# Patient Record
Sex: FEMALE | Race: BLACK OR AFRICAN AMERICAN | NOT HISPANIC OR LATINO | Employment: UNEMPLOYED | URBAN - METROPOLITAN AREA
[De-identification: names, ages, dates, MRNs, and addresses within clinical notes are randomized per-mention and may not be internally consistent; named-entity substitution may affect disease eponyms.]

---

## 2020-09-02 ENCOUNTER — OFFICE VISIT (OUTPATIENT)
Dept: OBSTETRICS AND GYNECOLOGY | Facility: CLINIC | Age: 30
End: 2020-09-02
Payer: MEDICAID

## 2020-09-02 ENCOUNTER — LAB VISIT (OUTPATIENT)
Dept: LAB | Facility: HOSPITAL | Age: 30
End: 2020-09-02
Attending: MIDWIFE
Payer: MEDICAID

## 2020-09-02 ENCOUNTER — PATIENT MESSAGE (OUTPATIENT)
Dept: OBSTETRICS AND GYNECOLOGY | Facility: CLINIC | Age: 30
End: 2020-09-02

## 2020-09-02 VITALS
HEIGHT: 63 IN | SYSTOLIC BLOOD PRESSURE: 110 MMHG | BODY MASS INDEX: 18.71 KG/M2 | WEIGHT: 105.63 LBS | DIASTOLIC BLOOD PRESSURE: 60 MMHG

## 2020-09-02 DIAGNOSIS — Z32.00 POSSIBLE PREGNANCY: ICD-10-CM

## 2020-09-02 DIAGNOSIS — Z32.00 POSSIBLE PREGNANCY: Primary | ICD-10-CM

## 2020-09-02 LAB — HCG INTACT+B SERPL-ACNC: 5.6 MIU/ML

## 2020-09-02 PROCEDURE — 99203 OFFICE O/P NEW LOW 30 MIN: CPT | Mod: PBBFAC | Performed by: MIDWIFE

## 2020-09-02 PROCEDURE — 99999 PR PBB SHADOW E&M-NEW PATIENT-LVL III: ICD-10-PCS | Mod: PBBFAC,,, | Performed by: MIDWIFE

## 2020-09-02 PROCEDURE — 87491 CHLMYD TRACH DNA AMP PROBE: CPT

## 2020-09-02 PROCEDURE — 99999 PR PBB SHADOW E&M-NEW PATIENT-LVL III: CPT | Mod: PBBFAC,,, | Performed by: MIDWIFE

## 2020-09-02 PROCEDURE — 36415 COLL VENOUS BLD VENIPUNCTURE: CPT

## 2020-09-02 PROCEDURE — 99203 OFFICE O/P NEW LOW 30 MIN: CPT | Mod: S$PBB,,, | Performed by: MIDWIFE

## 2020-09-02 PROCEDURE — 84702 CHORIONIC GONADOTROPIN TEST: CPT

## 2020-09-02 PROCEDURE — 99203 PR OFFICE/OUTPT VISIT, NEW, LEVL III, 30-44 MIN: ICD-10-PCS | Mod: S$PBB,,, | Performed by: MIDWIFE

## 2020-09-02 NOTE — PROGRESS NOTES
"Subjective:      Kelsi Ngo is a 29 y.o. female who presents for evaluation of amenorrhea. She believes she could be pregnant. Pregnancy is desired. Current symptoms also include: breast tenderness and positive home pregnancy test. Last period was abnormal. States had some light spotting for ~ 1 day, but denies any pain. Reports small amount of white discharge with a musty odor. Denies burning or itching. Reports having a PAP 2 years ago after the birth of her last baby, WNL per patient.      Patient's last menstrual period was 07/07/2020.  The following portions of the patient's history were reviewed and updated as appropriate: allergies, current medications, past family history, past medical history, past social history, past surgical history and problem list.    Review of Systems  Pertinent items are noted in HPI.       Objective:      /60   Ht 5' 3" (1.6 m)   Wt 47.9 kg (105 lb 9.6 oz)   LMP 07/07/2020   BMI 18.71 kg/m²   General: alert, appears stated age, no distress and no acute distress     Vagina: small amount of thin, white discharge present. Wet prep done: moderate amount of clue cells. No yeast or trich present.      Lab Review  Urine HCG: negative      Assessment:      Absence of menstruation.       Plan:     Gc/Chlamydia ordered and sent   Will do serum HCG today  Wet prep: + BV, will send Rx dependant on results of serum HCG  Discussed contraception if HCG quant is negative. Desires Depo-provera.   F/u dependant on HCG results. Due for PAP no later than 2021.    "

## 2020-09-08 ENCOUNTER — PATIENT MESSAGE (OUTPATIENT)
Dept: OBSTETRICS AND GYNECOLOGY | Facility: CLINIC | Age: 30
End: 2020-09-08

## 2020-10-03 LAB
C TRACH DNA SPEC QL NAA+PROBE: NOT DETECTED
N GONORRHOEA DNA SPEC QL NAA+PROBE: NOT DETECTED

## 2020-12-03 ENCOUNTER — PATIENT MESSAGE (OUTPATIENT)
Dept: OBSTETRICS AND GYNECOLOGY | Facility: CLINIC | Age: 30
End: 2020-12-03

## 2020-12-06 ENCOUNTER — PATIENT MESSAGE (OUTPATIENT)
Dept: OBSTETRICS AND GYNECOLOGY | Facility: CLINIC | Age: 30
End: 2020-12-06

## 2022-05-31 ENCOUNTER — LAB VISIT (OUTPATIENT)
Dept: LAB | Facility: HOSPITAL | Age: 32
End: 2022-05-31
Attending: ADVANCED PRACTICE MIDWIFE
Payer: MEDICAID

## 2022-05-31 ENCOUNTER — OFFICE VISIT (OUTPATIENT)
Dept: OBSTETRICS AND GYNECOLOGY | Facility: CLINIC | Age: 32
End: 2022-05-31
Payer: MEDICAID

## 2022-05-31 VITALS
WEIGHT: 120.38 LBS | HEIGHT: 63 IN | DIASTOLIC BLOOD PRESSURE: 58 MMHG | BODY MASS INDEX: 21.33 KG/M2 | SYSTOLIC BLOOD PRESSURE: 114 MMHG

## 2022-05-31 DIAGNOSIS — Z32.01 POSITIVE PREGNANCY TEST: ICD-10-CM

## 2022-05-31 DIAGNOSIS — Z98.891 PREVIOUS CESAREAN SECTION: Primary | ICD-10-CM

## 2022-05-31 PROCEDURE — 36415 COLL VENOUS BLD VENIPUNCTURE: CPT | Performed by: ADVANCED PRACTICE MIDWIFE

## 2022-05-31 PROCEDURE — 87491 CHLMYD TRACH DNA AMP PROBE: CPT | Performed by: ADVANCED PRACTICE MIDWIFE

## 2022-05-31 PROCEDURE — 87389 HIV-1 AG W/HIV-1&-2 AB AG IA: CPT | Performed by: ADVANCED PRACTICE MIDWIFE

## 2022-05-31 PROCEDURE — 86592 SYPHILIS TEST NON-TREP QUAL: CPT | Performed by: ADVANCED PRACTICE MIDWIFE

## 2022-05-31 PROCEDURE — 3074F SYST BP LT 130 MM HG: CPT | Mod: CPTII,,, | Performed by: ADVANCED PRACTICE MIDWIFE

## 2022-05-31 PROCEDURE — 85025 COMPLETE CBC W/AUTO DIFF WBC: CPT | Performed by: ADVANCED PRACTICE MIDWIFE

## 2022-05-31 PROCEDURE — 99203 PR OFFICE/OUTPT VISIT, NEW, LEVL III, 30-44 MIN: ICD-10-PCS | Mod: S$PBB,TH,, | Performed by: ADVANCED PRACTICE MIDWIFE

## 2022-05-31 PROCEDURE — 88175 CYTOPATH C/V AUTO FLUID REDO: CPT | Performed by: STUDENT IN AN ORGANIZED HEALTH CARE EDUCATION/TRAINING PROGRAM

## 2022-05-31 PROCEDURE — 3078F PR MOST RECENT DIASTOLIC BLOOD PRESSURE < 80 MM HG: ICD-10-PCS | Mod: CPTII,,, | Performed by: ADVANCED PRACTICE MIDWIFE

## 2022-05-31 PROCEDURE — 3008F PR BODY MASS INDEX (BMI) DOCUMENTED: ICD-10-PCS | Mod: CPTII,,, | Performed by: ADVANCED PRACTICE MIDWIFE

## 2022-05-31 PROCEDURE — 99212 OFFICE O/P EST SF 10 MIN: CPT | Mod: PBBFAC,TH | Performed by: ADVANCED PRACTICE MIDWIFE

## 2022-05-31 PROCEDURE — 88141 PR  CYTOPATH CERV/VAG INTERPRET: ICD-10-PCS | Mod: ,,, | Performed by: STUDENT IN AN ORGANIZED HEALTH CARE EDUCATION/TRAINING PROGRAM

## 2022-05-31 PROCEDURE — 86762 RUBELLA ANTIBODY: CPT | Performed by: ADVANCED PRACTICE MIDWIFE

## 2022-05-31 PROCEDURE — 99999 PR PBB SHADOW E&M-EST. PATIENT-LVL II: CPT | Mod: PBBFAC,,, | Performed by: ADVANCED PRACTICE MIDWIFE

## 2022-05-31 PROCEDURE — 86850 RBC ANTIBODY SCREEN: CPT | Performed by: ADVANCED PRACTICE MIDWIFE

## 2022-05-31 PROCEDURE — 99999 PR PBB SHADOW E&M-EST. PATIENT-LVL II: ICD-10-PCS | Mod: PBBFAC,,, | Performed by: ADVANCED PRACTICE MIDWIFE

## 2022-05-31 PROCEDURE — 99203 OFFICE O/P NEW LOW 30 MIN: CPT | Mod: S$PBB,TH,, | Performed by: ADVANCED PRACTICE MIDWIFE

## 2022-05-31 PROCEDURE — 88141 CYTOPATH C/V INTERPRET: CPT | Mod: ,,, | Performed by: STUDENT IN AN ORGANIZED HEALTH CARE EDUCATION/TRAINING PROGRAM

## 2022-05-31 PROCEDURE — 87624 HPV HI-RISK TYP POOLED RSLT: CPT | Performed by: ADVANCED PRACTICE MIDWIFE

## 2022-05-31 PROCEDURE — 86803 HEPATITIS C AB TEST: CPT | Performed by: ADVANCED PRACTICE MIDWIFE

## 2022-05-31 PROCEDURE — 3078F DIAST BP <80 MM HG: CPT | Mod: CPTII,,, | Performed by: ADVANCED PRACTICE MIDWIFE

## 2022-05-31 PROCEDURE — 3074F PR MOST RECENT SYSTOLIC BLOOD PRESSURE < 130 MM HG: ICD-10-PCS | Mod: CPTII,,, | Performed by: ADVANCED PRACTICE MIDWIFE

## 2022-05-31 PROCEDURE — 87340 HEPATITIS B SURFACE AG IA: CPT | Performed by: ADVANCED PRACTICE MIDWIFE

## 2022-05-31 PROCEDURE — 1159F PR MEDICATION LIST DOCUMENTED IN MEDICAL RECORD: ICD-10-PCS | Mod: CPTII,,, | Performed by: ADVANCED PRACTICE MIDWIFE

## 2022-05-31 PROCEDURE — 87591 N.GONORRHOEAE DNA AMP PROB: CPT | Performed by: ADVANCED PRACTICE MIDWIFE

## 2022-05-31 PROCEDURE — 3008F BODY MASS INDEX DOCD: CPT | Mod: CPTII,,, | Performed by: ADVANCED PRACTICE MIDWIFE

## 2022-05-31 PROCEDURE — 81025 URINE PREGNANCY TEST: CPT | Mod: PBBFAC | Performed by: ADVANCED PRACTICE MIDWIFE

## 2022-05-31 PROCEDURE — 1159F MED LIST DOCD IN RCRD: CPT | Mod: CPTII,,, | Performed by: ADVANCED PRACTICE MIDWIFE

## 2022-05-31 RX ORDER — ONDANSETRON 4 MG/1
4 TABLET, FILM COATED ORAL DAILY PRN
Qty: 30 TABLET | Refills: 1 | Status: SHIPPED | OUTPATIENT
Start: 2022-05-31 | End: 2022-07-05 | Stop reason: SDUPTHER

## 2022-05-31 NOTE — PROGRESS NOTES
CHIEF COMPLAINT:   Patient presents with      Possible Pregnancy        HISTORY OF PRESENT ILLNESS  Kelsi Ngo 31 y.o.  presents for pregnancy risk assessment.   The patient has no complaints today.  No nausea or vomiting. No bleeding or pain.  Pregnancy was not  planned but is desired.  Partner is supportive of pregnancy.  Lives at home with mom  No pets at home.  Works at home  Denies domestic abuse.  Denies chemical/pesticide/radiation exposure.  OB history: x3 C/S x1 PROM breech      LMP: Patient's last menstrual period was 2022 (exact date).  EDC: Estimated Date of Delivery: 22  EGA: 16w4d      Health Maintenance   Topic Date Due    Hepatitis C Screening  Never done    Lipid Panel  Never done    TETANUS VACCINE  Never done       Past Medical History:   Diagnosis Date    Heart murmur        Past Surgical History:   Procedure Laterality Date     SECTION      emergency c section 2021       Family History   Problem Relation Age of Onset    Cancer Paternal Grandmother     Hypertension Father     Hypertension Mother     Cancer Mother        Social History     Socioeconomic History    Marital status: Single   Tobacco Use    Smoking status: Never Smoker    Smokeless tobacco: Never Used   Substance and Sexual Activity    Alcohol use: Never    Drug use: Never    Sexual activity: Yes     Partners: Male     Birth control/protection: None       No current outpatient medications on file.     No current facility-administered medications for this visit.       Review of patient's allergies indicates:   Allergen Reactions    Ibuprofen Itching         PHYSICAL EXAM   Vitals:    22 1409   BP: (!) 114/58        PAIN SCALE: 0/10 None    PHYSICAL EXAM    ROS:  GENERAL: No fever, chills, fatigability or weight loss.  CV: Denies chest pain  PULM: Denies shortness of breath or wheezing.  ABDOMEN: Appetite fine. No weight loss. Denies diarrhea, abdominal pain, hematemesis or  blood in stool.  URINARY: No flank pain, dysuria or hematuria.  REPRODUCTIVE: No abnormal vaginal bleeding.       PE:   APPEARANCE: Well nourished, well developed, in no acute distress  CHEST: Clear to auscultation bilaterally  CV: Regular rate and rhythm  BREASTS: Symmetrical, no skin changes or visible lesions. No palpable masses, nipple discharge or adenopathy bilaterally.  ABDOMEN: Soft. No tenderness or masses. No hepatosplenomegaly. No hernias  PELVIC:   VULVA: No lesions. Normal female genitalia.  URETHRAL MEATUS: Normal size and location, no lesions, no prolapse.  URETHRA: No masses, tenderness, prolapse or scarring.  VAGINA: Moist and well rugated, no discharge, no significant cystocele or rectocele.  CERVIX: No lesions, normal diameter, no stenosis, no cervical motion tenderness.   UTERUS: 16size, regular shape, mobile, non-tender, normal position, good support.  ADNEXA: No masses, tenderness or CDS nodularity.  ANUS PERINEUM: No lesions, no relaxation, no external hemorrhoids.     UPT +    A/P:     -      Patient was counseled today on A.C.S. Pap guidelines and recommendations for yearly pelvic exams, mammograms and monthly self breast exams; to see her PCP for other health maintenance and pregnancy.   -      Patient's medications and medical history reviewed with patient and implications in pregnancy.   -      Pregnancy course discussed and 'AtoZ' book given. Patient was counseled on proper weight gain based on the [place] [employer]'s recommendations based on her pre-pregnancy weight. Discussed foods to avoid in pregnancy (i.e. sushi, fish that are high in mercury, deli meat, and unpasteurized cheeses). Discussed prenatal vitamin options (i.e. stool softener, DHA). Discussed potential medical problems in pregnancy.  -     Discussed risk of Toxoplasmosis transmission from pets and reviewed risk reduction techniques.    -     Chromosomal abnormality risk discussed and available testing offered -  declined.   -     Pt was counseled on exercise in pregnancy and weight gain recommendations.   - ACOG and SMFM recommend that all eligible persons, including pregnant and lactating individuals, receive a COVID-19 vaccine or vaccine series. There is no evidence of adverse maternal or fetal effects from vaccinating pregnant individuals with COVID-19 vaccine, and a growing body of data demonstrate the safety of such use. Discussed that there is no indication to delay vaccination until after the first trimester or until the postpartum period.   Claims linking COVID-19 vaccines to infertility are unfounded and have no scientific evidence supporting them. ACOG recommends vaccination for all eligible people who may consider future pregnancy.   Expected side effects were discussed and explained that they are a normal part of the body's reaction to the vaccine and developing antibodies to protect against COVID-19 illness.   Women under age 50 including pregnant individuals can receive any FDA-authorized COVID-19 vaccine available to them. However, there is a rare risk (around 1 in 150,000) of thrombosis with thrombocytopenia syndrome (TTS) after receipt of the Rima COVID-19 vaccine which was discussed.   COVID-19 vaccines may be administered simultaneously with other vaccines, including within 14 days of receipt of another vaccine. This includes vaccines routinely administered during pregnancy, such as influenza and Tdap.    -     Oriented to practice including CNM collaboration.   -     Follow-up initial OB, with labs and u/s.  Pap and genprobe done

## 2022-06-01 ENCOUNTER — PATIENT MESSAGE (OUTPATIENT)
Dept: OBSTETRICS AND GYNECOLOGY | Facility: CLINIC | Age: 32
End: 2022-06-01
Payer: MEDICAID

## 2022-06-01 PROBLEM — D50.8 IRON DEFICIENCY ANEMIA SECONDARY TO INADEQUATE DIETARY IRON INTAKE: Status: ACTIVE | Noted: 2022-06-01

## 2022-06-01 LAB
ABO + RH BLD: NORMAL
BASOPHILS # BLD AUTO: 0.04 K/UL (ref 0–0.2)
BASOPHILS NFR BLD: 0.6 % (ref 0–1.9)
BLD GP AB SCN CELLS X3 SERPL QL: NORMAL
DIFFERENTIAL METHOD: ABNORMAL
EOSINOPHIL # BLD AUTO: 0.1 K/UL (ref 0–0.5)
EOSINOPHIL NFR BLD: 1 % (ref 0–8)
ERYTHROCYTE [DISTWIDTH] IN BLOOD BY AUTOMATED COUNT: 14.6 % (ref 11.5–14.5)
HBV SURFACE AG SERPL QL IA: NEGATIVE
HCT VFR BLD AUTO: 30.9 % (ref 37–48.5)
HCV AB SERPL QL IA: NEGATIVE
HGB BLD-MCNC: 9.8 G/DL (ref 12–16)
HIV 1+2 AB+HIV1 P24 AG SERPL QL IA: NEGATIVE
IMM GRANULOCYTES # BLD AUTO: 0.02 K/UL (ref 0–0.04)
IMM GRANULOCYTES NFR BLD AUTO: 0.3 % (ref 0–0.5)
LYMPHOCYTES # BLD AUTO: 1.6 K/UL (ref 1–4.8)
LYMPHOCYTES NFR BLD: 24.4 % (ref 18–48)
MCH RBC QN AUTO: 28.7 PG (ref 27–31)
MCHC RBC AUTO-ENTMCNC: 31.7 G/DL (ref 32–36)
MCV RBC AUTO: 91 FL (ref 82–98)
MONOCYTES # BLD AUTO: 0.4 K/UL (ref 0.3–1)
MONOCYTES NFR BLD: 6.5 % (ref 4–15)
NEUTROPHILS # BLD AUTO: 4.5 K/UL (ref 1.8–7.7)
NEUTROPHILS NFR BLD: 67.2 % (ref 38–73)
NRBC BLD-RTO: 0 /100 WBC
PLATELET # BLD AUTO: 135 K/UL (ref 150–450)
PMV BLD AUTO: 13.3 FL (ref 9.2–12.9)
RBC # BLD AUTO: 3.41 M/UL (ref 4–5.4)
RPR SER QL: NORMAL
RUBV IGG SER-ACNC: 14 IU/ML
RUBV IGG SER-IMP: REACTIVE
WBC # BLD AUTO: 6.72 K/UL (ref 3.9–12.7)

## 2022-06-01 RX ORDER — FERROUS SULFATE 325(65) MG
325 TABLET, DELAYED RELEASE (ENTERIC COATED) ORAL 2 TIMES DAILY
Qty: 60 TABLET | Refills: 3 | Status: SHIPPED | OUTPATIENT
Start: 2022-06-01 | End: 2022-07-05 | Stop reason: SDUPTHER

## 2022-06-03 LAB
C TRACH DNA SPEC QL NAA+PROBE: NOT DETECTED
N GONORRHOEA DNA SPEC QL NAA+PROBE: NOT DETECTED

## 2022-06-08 ENCOUNTER — DOCUMENTATION ONLY (OUTPATIENT)
Dept: OBSTETRICS AND GYNECOLOGY | Facility: CLINIC | Age: 32
End: 2022-06-08

## 2022-06-08 ENCOUNTER — PROCEDURE VISIT (OUTPATIENT)
Dept: OBSTETRICS AND GYNECOLOGY | Facility: CLINIC | Age: 32
End: 2022-06-08
Payer: MEDICAID

## 2022-06-08 DIAGNOSIS — Z32.01 POSITIVE PREGNANCY TEST: ICD-10-CM

## 2022-06-08 PROCEDURE — 76815 OB US LIMITED FETUS(S): CPT | Mod: 26,S$PBB,, | Performed by: OBSTETRICS & GYNECOLOGY

## 2022-06-08 PROCEDURE — 76801 OB US < 14 WKS SINGLE FETUS: CPT | Mod: PBBFAC | Performed by: OBSTETRICS & GYNECOLOGY

## 2022-06-08 PROCEDURE — 76815 US OB/GYN PROCEDURE (VIEWPOINT): ICD-10-PCS | Mod: 26,S$PBB,, | Performed by: OBSTETRICS & GYNECOLOGY

## 2022-06-08 NOTE — PROGRESS NOTES
Patient left prior to being seen by provider following her ultrasound due to family emergency. Will have staff reschedule aptSukhi Galvez

## 2022-06-10 LAB
FINAL PATHOLOGIC DIAGNOSIS: ABNORMAL
Lab: ABNORMAL

## 2022-06-15 LAB
HPV HR 12 DNA SPEC QL NAA+PROBE: NEGATIVE
HPV16 AG SPEC QL: NEGATIVE
HPV18 DNA SPEC QL NAA+PROBE: NEGATIVE

## 2022-06-19 PROBLEM — R87.610 ASCUS OF CERVIX WITH NEGATIVE HIGH RISK HPV: Status: ACTIVE | Noted: 2022-06-19

## 2022-06-21 ENCOUNTER — TELEPHONE (OUTPATIENT)
Dept: OBSTETRICS AND GYNECOLOGY | Facility: CLINIC | Age: 32
End: 2022-06-21

## 2022-06-27 PROBLEM — O34.219 HISTORY OF CESAREAN DELIVERY, CURRENTLY PREGNANT: Status: ACTIVE | Noted: 2022-06-27

## 2022-06-27 PROBLEM — O99.011 ANEMIA DURING PREGNANCY IN FIRST TRIMESTER: Status: ACTIVE | Noted: 2022-06-01

## 2022-07-18 ENCOUNTER — LAB VISIT (OUTPATIENT)
Dept: LAB | Facility: HOSPITAL | Age: 32
End: 2022-07-18
Payer: MEDICAID

## 2022-07-18 ENCOUNTER — INITIAL PRENATAL (OUTPATIENT)
Dept: OBSTETRICS AND GYNECOLOGY | Facility: CLINIC | Age: 32
End: 2022-07-18
Payer: MEDICAID

## 2022-07-18 VITALS
SYSTOLIC BLOOD PRESSURE: 102 MMHG | BODY MASS INDEX: 22.18 KG/M2 | DIASTOLIC BLOOD PRESSURE: 60 MMHG | WEIGHT: 125.25 LBS

## 2022-07-18 DIAGNOSIS — N89.8 VAGINAL ODOR: ICD-10-CM

## 2022-07-18 DIAGNOSIS — O09.32 LIMITED PRENATAL CARE IN SECOND TRIMESTER: ICD-10-CM

## 2022-07-18 DIAGNOSIS — Z34.92 NORMAL PREGNANCY IN SECOND TRIMESTER: ICD-10-CM

## 2022-07-18 DIAGNOSIS — K59.00 CONSTIPATION, UNSPECIFIED CONSTIPATION TYPE: ICD-10-CM

## 2022-07-18 DIAGNOSIS — O34.219 PATIENT DESIRES VAGINAL BIRTH AFTER CESAREAN SECTION (VBAC): ICD-10-CM

## 2022-07-18 DIAGNOSIS — Z3A.23 23 WEEKS GESTATION OF PREGNANCY: Primary | ICD-10-CM

## 2022-07-18 PROCEDURE — 99999 PR PBB SHADOW E&M-EST. PATIENT-LVL II: CPT | Mod: PBBFAC,,, | Performed by: MIDWIFE

## 2022-07-18 PROCEDURE — 99213 PR OFFICE/OUTPT VISIT, EST, LEVL III, 20-29 MIN: ICD-10-PCS | Mod: S$PBB,TH,, | Performed by: MIDWIFE

## 2022-07-18 PROCEDURE — 87481 CANDIDA DNA AMP PROBE: CPT | Mod: 59 | Performed by: MIDWIFE

## 2022-07-18 PROCEDURE — 36415 COLL VENOUS BLD VENIPUNCTURE: CPT | Performed by: MIDWIFE

## 2022-07-18 PROCEDURE — 99213 OFFICE O/P EST LOW 20 MIN: CPT | Mod: S$PBB,TH,, | Performed by: MIDWIFE

## 2022-07-18 PROCEDURE — 99999 PR PBB SHADOW E&M-EST. PATIENT-LVL II: ICD-10-PCS | Mod: PBBFAC,,, | Performed by: MIDWIFE

## 2022-07-18 PROCEDURE — 99212 OFFICE O/P EST SF 10 MIN: CPT | Mod: PBBFAC,TH | Performed by: MIDWIFE

## 2022-07-18 RX ORDER — DOCUSATE SODIUM 100 MG/1
100 CAPSULE, LIQUID FILLED ORAL 2 TIMES DAILY PRN
Qty: 30 CAPSULE | Refills: 1 | Status: SHIPPED | OUTPATIENT
Start: 2022-07-18 | End: 2022-08-22 | Stop reason: SDUPTHER

## 2022-07-18 NOTE — PROGRESS NOTES
31 year old L5 here for Initial OB visit  Lapse in care since pregnancy confirmation visit. Pt states she had to leave after her dating scan d/t a family emergency.  Doing well, lots of fetal movement!  Denies CTX, VB, LOF. Just some discomfort over her c/s scar that resolves with warm compresses.   C/o white vaginal discharge with odor. AFFIRM collected.  C/o toothache. Patient is established with a dentist. Letter given for clearance and instructed to go ASAP.   Reviewed problem list with patient. ASCUS PAP with negative HPV. Patient states she had recent colposcopy at  and was told this was normal, but to repeat PAP in one year. Records requested.  Patient desires TOLAC. Previous SVDx3 with PCS for twins/PPROM. KYLE sent for OP report.  Is taking iron as prescribed, but states makes her constipated. Rx sent for Colace.   Discussed genetic screening in pregnancy. Desires Mat21 today.  4 lb 13.6 oz TWG, S=D  Normal kick counts and PTL precautions reviewed  RTC in 1-2 weeks with anatomy scan, sooner if needed    I spent a total of 20 minutes on the day of the visit.This includes face to face time and non-face to face time preparing to see the patient (eg, review of tests), Obtaining and/or reviewing separately obtained history, Documenting clinical information in the electronic or other health record, Independently interpreting resultsand communicating results to the patient/family/caregiver, or Care coordination.

## 2022-07-19 ENCOUNTER — PATIENT MESSAGE (OUTPATIENT)
Dept: OBSTETRICS AND GYNECOLOGY | Facility: CLINIC | Age: 32
End: 2022-07-19
Payer: MEDICAID

## 2022-07-19 DIAGNOSIS — B96.89 BV (BACTERIAL VAGINOSIS): Primary | ICD-10-CM

## 2022-07-19 DIAGNOSIS — N76.0 BV (BACTERIAL VAGINOSIS): Primary | ICD-10-CM

## 2022-07-19 LAB
BACTERIAL VAGINOSIS DNA: POSITIVE
CANDIDA GLABRATA DNA: NEGATIVE
CANDIDA KRUSEI DNA: NEGATIVE
CANDIDA RRNA VAG QL PROBE: NEGATIVE
T VAGINALIS RRNA GENITAL QL PROBE: NEGATIVE

## 2022-07-19 RX ORDER — METRONIDAZOLE 500 MG/1
500 TABLET ORAL EVERY 12 HOURS
Qty: 14 TABLET | Refills: 0 | Status: SHIPPED | OUTPATIENT
Start: 2022-07-19 | End: 2022-07-26

## 2022-07-26 ENCOUNTER — PATIENT MESSAGE (OUTPATIENT)
Dept: OBSTETRICS AND GYNECOLOGY | Facility: CLINIC | Age: 32
End: 2022-07-26
Payer: MEDICAID

## 2022-08-22 ENCOUNTER — PATIENT MESSAGE (OUTPATIENT)
Dept: OBSTETRICS AND GYNECOLOGY | Facility: CLINIC | Age: 32
End: 2022-08-22
Payer: MEDICAID

## 2022-08-22 DIAGNOSIS — K59.00 CONSTIPATION, UNSPECIFIED CONSTIPATION TYPE: ICD-10-CM

## 2022-08-22 NOTE — TELEPHONE ENCOUNTER
Please see the attached refill request.  Medication Renewal Request  Received: Today  Kelsi Ngo Staff  Phone Number: [phone]     Refills have been requested for the following medications:         docusate sodium (COLACE) 100 MG capsule [Jayna Villa]     [id number] [facility]   Delivery method: Pickup       Medication renewals requested in this message routed separately:         prenatal vit 14-iron fum-folic (COMPLETENATE) 29 mg iron- 1 mg Chew [Serina Hair]         ondansetron (ZOFRAN) 4 MG tablet [Serina Hair]         ferrous sulfate 325 (65 FE) MG EC tablet [Serina Hair]

## 2022-08-23 RX ORDER — DOCUSATE SODIUM 100 MG/1
100 CAPSULE, LIQUID FILLED ORAL 2 TIMES DAILY PRN
Qty: 30 CAPSULE | Refills: 1 | Status: SHIPPED | OUTPATIENT
Start: 2022-08-23

## 2022-09-15 ENCOUNTER — PATIENT MESSAGE (OUTPATIENT)
Dept: ADMINISTRATIVE | Facility: OTHER | Age: 32
End: 2022-09-15
Payer: MEDICAID

## 2022-10-22 ENCOUNTER — HOSPITAL ENCOUNTER (OUTPATIENT)
Facility: HOSPITAL | Age: 32
Discharge: HOME | End: 2022-10-22
Attending: OBSTETRICS & GYNECOLOGY | Admitting: OBSTETRICS & GYNECOLOGY
Payer: MEDICAID

## 2022-10-22 VITALS — SYSTOLIC BLOOD PRESSURE: 112 MMHG | DIASTOLIC BLOOD PRESSURE: 93 MMHG | HEART RATE: 75 BPM | OXYGEN SATURATION: 98 %

## 2022-10-22 DIAGNOSIS — N89.8 CLEAR VAGINAL DISCHARGE: ICD-10-CM

## 2022-10-22 PROBLEM — O09.32 LIMITED PRENATAL CARE IN SECOND TRIMESTER: Status: RESOLVED | Noted: 2022-07-18 | Resolved: 2022-10-22

## 2022-10-22 PROBLEM — Z34.92 NORMAL PREGNANCY IN SECOND TRIMESTER: Status: RESOLVED | Noted: 2022-07-18 | Resolved: 2022-10-22

## 2022-10-22 PROCEDURE — 99214 PR OFFICE/OUTPT VISIT, EST, LEVL IV, 30-39 MIN: ICD-10-PCS | Mod: TH,,, | Performed by: OBSTETRICS & GYNECOLOGY

## 2022-10-22 PROCEDURE — 59025 FETAL NON-STRESS TEST: CPT

## 2022-10-22 PROCEDURE — 99214 OFFICE O/P EST MOD 30 MIN: CPT | Mod: TH,,, | Performed by: OBSTETRICS & GYNECOLOGY

## 2022-10-22 PROCEDURE — 99211 OFF/OP EST MAY X REQ PHY/QHP: CPT | Mod: 25

## 2022-10-22 RX ORDER — ONDANSETRON 8 MG/1
8 TABLET, ORALLY DISINTEGRATING ORAL EVERY 8 HOURS PRN
Status: DISCONTINUED | OUTPATIENT
Start: 2022-10-22 | End: 2022-10-22 | Stop reason: HOSPADM

## 2022-10-22 RX ORDER — PROCHLORPERAZINE EDISYLATE 5 MG/ML
5 INJECTION INTRAMUSCULAR; INTRAVENOUS EVERY 6 HOURS PRN
Status: DISCONTINUED | OUTPATIENT
Start: 2022-10-22 | End: 2022-10-22 | Stop reason: HOSPADM

## 2022-10-22 RX ORDER — ACETAMINOPHEN 500 MG
500 TABLET ORAL EVERY 6 HOURS PRN
Status: DISCONTINUED | OUTPATIENT
Start: 2022-10-22 | End: 2022-10-22 | Stop reason: HOSPADM

## 2022-10-22 RX ORDER — SODIUM CHLORIDE, SODIUM LACTATE, POTASSIUM CHLORIDE, CALCIUM CHLORIDE 600; 310; 30; 20 MG/100ML; MG/100ML; MG/100ML; MG/100ML
INJECTION, SOLUTION INTRAVENOUS CONTINUOUS
Status: DISCONTINUED | OUTPATIENT
Start: 2022-10-22 | End: 2022-10-22 | Stop reason: HOSPADM

## 2022-10-22 NOTE — ASSESSMENT & PLAN NOTE
Observe  NST  Nitrazene and fern negative  Wep prep- + yeast  SVE- 2cm/50/-3  10/22/2022  1814--no cervical change  Stable for discharge, has appt 10/25; labor precautions reviewed

## 2022-10-22 NOTE — HOSPITAL COURSE
@ 37w2d R/O labor  History of 2 vag del and 1   Observe  PO hydrate  Nitrazene and fern neg  Wet prep- +yeast  NST  10/22/2022; --1+/50/-2 sandy

## 2022-10-22 NOTE — H&P
[facility] - Labor & Delivery  Obstetrics  History & Physical    Patient Name: Kelsi Ngo  MRN: [id number]  Admission Date: 10/22/2022  Primary Care Provider: Primary Doctor No    Subjective:     Principal Problem:Clear vaginal discharge    History of Present Illness:  32 yo  from home via amulance with c/o leaking fluid and contractions      No new subjective & objective note has been filed under this hospital service since the last note was generated.    Assessment/Plan:     31 y.o. female  at 37w2d for:    * Clear vaginal discharge  Observe  NST  Nitrazene and fern negative  Wep prep- + yeast  SVE- 2cm/50/-3  10/22/2022  1814--no cervical change  Stable for discharge, has appt 10/25; labor precautions reviewed    Anemia during pregnancy in first trimester  Continue daily iron        Alicia Davis MD  Obstetrics  [facility] - [facility]

## 2022-10-22 NOTE — SUBJECTIVE & OBJECTIVE
Obstetric HPI:  Patient reports leaking fluid when bending today x 2 and mild contractions, active fetal movement, No vaginal bleeding , Yes loss of fluid     This pregnancy has been complicated by anemia, limited PNC and previous  section    OB History    Para Term  AB Living   8 4 3 1 3 5   SAB IAB Ectopic Multiple Live Births   3 0 0 1 5      # Outcome Date GA Lbr Dilshad/2nd Weight Sex Delivery Anes PTL Lv   8 Current            7A  21 33w0d  1.474 kg (3 lb 4 oz) F CS-LTranv EPI  JAVI   7B     1.474 kg (3 lb 4 oz) M CS-LTranv   JAVI   6 SAB 20           5 Term 08/10/18 39w0d   F Vag-Spont EPI N JAVI   4 Term 17 39w0d   F Vag-Spont EPI N JAVI   3 SAB 10/30/11           2 2011           1 Term 11/26/10    F Vag-Spont EPI N JAVI     Past Medical History:   Diagnosis Date    Heart murmur      Past Surgical History:   Procedure Laterality Date     SECTION      emergency c section 2021       PTA Medications   Medication Sig    docusate sodium (COLACE) 100 MG capsule Take 1 capsule (100 mg total) by mouth 2 (two) times daily as needed for Constipation.    ferrous sulfate 325 (65 FE) MG EC tablet Take 1 tablet (325 mg total) by mouth 2 (two) times daily.    ondansetron (ZOFRAN) 4 MG tablet Take 1 tablet (4 mg total) by mouth daily as needed for Nausea.    prenatal vit 14-iron fum-folic (COMPLETENATE) 29 mg iron- 1 mg Chew Take 1 tablet by mouth once daily.       Review of patient's allergies indicates:   Allergen Reactions    Ibuprofen Itching        Family History       Problem Relation (Age of Onset)    Cancer Paternal Grandmother, Mother    Diabetes Paternal Grandmother    Hypertension Paternal Grandmother, Father, Mother, Maternal Grandmother          Tobacco Use    Smoking status: Never    Smokeless tobacco: Never   Substance and Sexual Activity    Alcohol use: Never    Drug use: Never    Sexual activity: Yes     Partners: Male     Birth control/protection:  None     Review of Systems   Constitutional:  Negative for activity change, appetite change and fever.   Eyes:  Negative for visual disturbance.   Respiratory:  Negative for cough.    Gastrointestinal:  Positive for abdominal pain. Negative for nausea and vomiting.   Genitourinary:  Negative for vaginal bleeding.   Musculoskeletal:  Negative for back pain.   Neurological:  Negative for vertigo, syncope and headaches.   Psychiatric/Behavioral:  Negative for depression.     Objective:     Vital Signs (Most Recent):    Vital Signs (24h Range):           There is no height or weight on file to calculate BMI.    FHT: 130Cat 1 (reassuring)  TOCO:  Q 2-3 minutes    Physical Exam:   Constitutional: She is oriented to person, place, and time. She appears well-developed and well-nourished.    HENT:   Head: Normocephalic and atraumatic.    Eyes: EOM are normal.      Pulmonary/Chest: Effort normal.        Abdominal: Soft.   Gravid             Musculoskeletal: Normal range of motion and moves all extremeties.       Neurological: She is alert and oriented to person, place, and time.    Skin: Skin is warm and dry.    Psychiatric: She has a normal mood and affect. Her behavior is normal. Judgment and thought content normal.     Cervix:  Dilation:  2  Effacement:  50%  Station: -3  Presentation: Vertex     Significant Labs:  Lab Results   Component Value Date    GROUPTRH O POS 05/31/2022    HEPBSAG Negative 05/31/2022       I have personallly reviewed all pertinent lab results from the last 24 hours.

## 2022-10-22 NOTE — H&P
[facility] - Labor & Delivery  Obstetrics  History & Physical    Patient Name: Kelsi Ngo  MRN: [id number]  Admission Date: 10/22/2022  Primary Care Provider: Primary Doctor No    Subjective:     Principal Problem:Clear vaginal discharge    History of Present Illness:  30 yo  from home via amulance with c/o leaking fluid      Obstetric HPI:  Patient reports leaking fluid when bending today x 2 and mild contractions, active fetal movement, No vaginal bleeding , Yes loss of fluid     This pregnancy has been complicated by anemia, limited PNC and previous  section    OB History    Para Term  AB Living   8 4 3 1 3 5   SAB IAB Ectopic Multiple Live Births   3 0 0 1 5      # Outcome Date GA Lbr Dilshad/2nd Weight Sex Delivery Anes PTL Lv   8 Current            7A  21 33w0d  1.474 kg (3 lb 4 oz) F CS-LTranv EPI  JAVI   7B     1.474 kg (3 lb 4 oz) M CS-LTranv   JAVI   6 SAB 20           5 Term 08/10/18 39w0d   F Vag-Spont EPI N JAVI   4 Term 17 39w0d   F Vag-Spont EPI N JAVI   3 SAB 10/30/11           2 SAB            1 Term 11/26/10    F Vag-Spont EPI N JAVI     Past Medical History:   Diagnosis Date    Heart murmur      Past Surgical History:   Procedure Laterality Date     SECTION      emergency c section 2021       PTA Medications   Medication Sig    docusate sodium (COLACE) 100 MG capsule Take 1 capsule (100 mg total) by mouth 2 (two) times daily as needed for Constipation.    ferrous sulfate 325 (65 FE) MG EC tablet Take 1 tablet (325 mg total) by mouth 2 (two) times daily.    ondansetron (ZOFRAN) 4 MG tablet Take 1 tablet (4 mg total) by mouth daily as needed for Nausea.    prenatal vit 14-iron fum-folic (COMPLETENATE) 29 mg iron- 1 mg Chew Take 1 tablet by mouth once daily.       Review of patient's allergies indicates:   Allergen Reactions    Ibuprofen Itching        Family History       Problem Relation (Age of Onset)    Cancer Paternal  Grandmother, Mother    Diabetes Paternal Grandmother    Hypertension Paternal Grandmother, Father, Mother, Maternal Grandmother          Tobacco Use    Smoking status: Never    Smokeless tobacco: Never   Substance and Sexual Activity    Alcohol use: Never    Drug use: Never    Sexual activity: Yes     Partners: Male     Birth control/protection: None     Review of Systems   Constitutional:  Negative for activity change, appetite change and fever.   Eyes:  Negative for visual disturbance.   Respiratory:  Negative for cough.    Gastrointestinal:  Positive for abdominal pain. Negative for nausea and vomiting.   Genitourinary:  Negative for vaginal bleeding.   Musculoskeletal:  Negative for back pain.   Neurological:  Negative for vertigo, syncope and headaches.   Psychiatric/Behavioral:  Negative for depression.     Objective:     Vital Signs (Most Recent):    Vital Signs (24h Range):           There is no height or weight on file to calculate BMI.    FHT: 130Cat 1 (reassuring)  TOCO:  Q 2-3 minutes    Physical Exam:   Constitutional: She is oriented to person, place, and time. She appears well-developed and well-nourished.    HENT:   Head: Normocephalic and atraumatic.    Eyes: EOM are normal.      Pulmonary/Chest: Effort normal.        Abdominal: Soft.   Gravid             Musculoskeletal: Normal range of motion and moves all extremeties.       Neurological: She is alert and oriented to person, place, and time.    Skin: Skin is warm and dry.    Psychiatric: She has a normal mood and affect. Her behavior is normal. Judgment and thought content normal.     Cervix:  Dilation:  2  Effacement:  50%  Station: -3  Presentation: Vertex     Significant Labs:  Lab Results   Component Value Date    GROUPTRH O POS 2022    HEPBSAG Negative 2022       I have personallly reviewed all pertinent lab results from the last 24 hours.    Assessment/Plan:     31 y.o. female  at 37w2d for:    * Clear vaginal  discharge  Observe  NST  Nitrazene and fern negative  Wep prep- + yeast  SVE- 2cm/50/-3        Martina Hoffman CNM  Obstetrics  [facility] - [facility]

## 2022-10-22 NOTE — DISCHARGE SUMMARY
[facility] - Labor & Delivery  Obstetrics  Discharge Summary      Patient Name: Kelsi Ngo  MRN: [id number]  Admission Date: 10/22/2022  Hospital Length of Stay: 0 days  Discharge Date and Time:  10/22/2022 6:17 PM  Attending Physician: Alicia Davis MD   Discharging Provider: Alicia Davis MD   Primary Care Provider: Primary Doctor No    HPI: 30 yo  from home via amulance with c/o leaking fluid and contractions      FHT: 120Cat 1 (reassuring)  TOCO:  Q 2-5 minutes    * No surgery found *     Hospital Course:    @ 37w2d R/O labor  History of 2 vag del and 1   Observe  PO hydrate  Nitrazene and fern neg  Wet prep- +yeast  NST  10/22/2022; --1+/50/-2 ballot           Final Active Diagnoses:    Diagnosis Date Noted POA    PRINCIPAL PROBLEM:  Clear vaginal discharge [N89.8] 10/22/2022 Yes    Anemia during pregnancy in first trimester [O99.011] 2022 Yes      Problems Resolved During this Admission:        Significant Diagnostic Studies: none      Feeding Method: n/a    Immunizations     None          This patient has no babies on file.  Pending Diagnostic Studies:     None          Discharged Condition: good    Disposition: Home or Self Care    Follow Up:   Follow-up Information     Jayna Villa CNM Follow up.    Specialty: Obstetrics and Gynecology  Why: keep scheduled appt  Contact information:  [address] [facility] [address] [phone]             Jayna Villa .                     Patient Instructions:   No discharge procedures on file.  Medications:  Current Discharge Medication List      CONTINUE these medications which have NOT CHANGED    Details   docusate sodium (COLACE) 100 MG capsule Take 1 capsule (100 mg total) by mouth 2 (two) times daily as needed for Constipation.  Qty: 30 capsule, Refills: 1    Associated Diagnoses: Constipation, unspecified constipation type      ferrous sulfate 325 (65 FE) MG EC tablet Take 1 tablet (325 mg total) by mouth 2 (two)  times daily.  Qty: 60 tablet, Refills: 3      ondansetron (ZOFRAN) 4 MG tablet Take 1 tablet (4 mg total) by mouth daily as needed for Nausea.  Qty: 30 tablet, Refills: 1      prenatal vit 14-iron fum-folic (COMPLETENATE) 29 mg iron- 1 mg Chew Take 1 tablet by mouth once daily.  Qty: 90 tablet, Refills: 3             Alicia Davis MD  Obstetrics  [facility]

## 2022-10-24 PROBLEM — O09.33 LIMITED PRENATAL CARE IN THIRD TRIMESTER: Status: ACTIVE | Noted: 2022-10-24

## 2022-11-03 ENCOUNTER — ANESTHESIA (OUTPATIENT)
Dept: OBSTETRICS AND GYNECOLOGY | Facility: HOSPITAL | Age: 32
End: 2022-11-03
Payer: MEDICAID

## 2022-11-03 ENCOUNTER — ANESTHESIA EVENT (OUTPATIENT)
Dept: OBSTETRICS AND GYNECOLOGY | Facility: HOSPITAL | Age: 32
End: 2022-11-03
Payer: MEDICAID

## 2022-11-03 ENCOUNTER — HOSPITAL ENCOUNTER (INPATIENT)
Facility: HOSPITAL | Age: 32
LOS: 2 days | Discharge: HOME | End: 2022-11-05
Attending: OBSTETRICS & GYNECOLOGY | Admitting: OBSTETRICS & GYNECOLOGY
Payer: MEDICAID

## 2022-11-03 DIAGNOSIS — O47.9 THREATENED LABOR AT TERM: ICD-10-CM

## 2022-11-03 DIAGNOSIS — Z98.891 S/P CESAREAN SECTION: ICD-10-CM

## 2022-11-03 PROBLEM — O99.013 ANEMIA DURING PREGNANCY IN THIRD TRIMESTER: Status: ACTIVE | Noted: 2022-06-01

## 2022-11-03 PROBLEM — F12.90 MARIJUANA USE DURING PREGNANCY: Status: ACTIVE | Noted: 2022-11-03

## 2022-11-03 PROBLEM — O99.320 MARIJUANA USE DURING PREGNANCY: Status: ACTIVE | Noted: 2022-11-03

## 2022-11-03 LAB
ABO + RH BLD: NORMAL
AMPHET+METHAMPHET UR QL: NEGATIVE
BARBITURATES UR QL SCN>200 NG/ML: NEGATIVE
BASOPHILS # BLD AUTO: 0.01 K/UL (ref 0–0.2)
BASOPHILS NFR BLD: 0.1 % (ref 0–1.9)
BENZODIAZ UR QL SCN>200 NG/ML: NEGATIVE
BLD GP AB SCN CELLS X3 SERPL QL: NORMAL
BZE UR QL SCN: NEGATIVE
CANNABINOIDS UR QL SCN: ABNORMAL
CREAT UR-MCNC: 241.9 MG/DL (ref 15–325)
DIFFERENTIAL METHOD: ABNORMAL
EOSINOPHIL # BLD AUTO: 0 K/UL (ref 0–0.5)
EOSINOPHIL NFR BLD: 0 % (ref 0–8)
ERYTHROCYTE [DISTWIDTH] IN BLOOD BY AUTOMATED COUNT: 15 % (ref 11.5–14.5)
HCT VFR BLD AUTO: 30.8 % (ref 37–48.5)
HGB BLD-MCNC: 10 G/DL (ref 12–16)
HIV1+2 IGG SERPL QL IA.RAPID: NORMAL
IMM GRANULOCYTES # BLD AUTO: 0.06 K/UL (ref 0–0.04)
IMM GRANULOCYTES NFR BLD AUTO: 0.9 % (ref 0–0.5)
LYMPHOCYTES # BLD AUTO: 0.4 K/UL (ref 1–4.8)
LYMPHOCYTES NFR BLD: 6.1 % (ref 18–48)
MCH RBC QN AUTO: 29.3 PG (ref 27–31)
MCHC RBC AUTO-ENTMCNC: 32.5 G/DL (ref 32–36)
MCV RBC AUTO: 90 FL (ref 82–98)
METHADONE UR QL SCN>300 NG/ML: NEGATIVE
MONOCYTES # BLD AUTO: 0.6 K/UL (ref 0.3–1)
MONOCYTES NFR BLD: 8.7 % (ref 4–15)
NEUTROPHILS # BLD AUTO: 5.7 K/UL (ref 1.8–7.7)
NEUTROPHILS NFR BLD: 84.2 % (ref 38–73)
NRBC BLD-RTO: 0 /100 WBC
OPIATES UR QL SCN: NEGATIVE
PCP UR QL SCN>25 NG/ML: NEGATIVE
PLATELET # BLD AUTO: 149 K/UL (ref 150–450)
PMV BLD AUTO: 12.5 FL (ref 9.2–12.9)
RBC # BLD AUTO: 3.41 M/UL (ref 4–5.4)
RPR SER QL: NORMAL
TOXICOLOGY INFORMATION: ABNORMAL
WBC # BLD AUTO: 6.75 K/UL (ref 3.9–12.7)

## 2022-11-03 PROCEDURE — 59514 CESAREAN DELIVERY ONLY: CPT | Mod: AS,GB,, | Performed by: PHYSICIAN ASSISTANT

## 2022-11-03 PROCEDURE — 11000001 HC ACUTE MED/SURG PRIVATE ROOM

## 2022-11-03 PROCEDURE — 25000003 PHARM REV CODE 250: Performed by: NURSE ANESTHETIST, CERTIFIED REGISTERED

## 2022-11-03 PROCEDURE — 27200688 HC TRAY, SPINAL-HYPER/ ISOBARIC: Performed by: STUDENT IN AN ORGANIZED HEALTH CARE EDUCATION/TRAINING PROGRAM

## 2022-11-03 PROCEDURE — 25000003 PHARM REV CODE 250: Performed by: OBSTETRICS & GYNECOLOGY

## 2022-11-03 PROCEDURE — 85025 COMPLETE CBC W/AUTO DIFF WBC: CPT | Performed by: MIDWIFE

## 2022-11-03 PROCEDURE — 71000033 HC RECOVERY, INTIAL HOUR: Performed by: OBSTETRICS & GYNECOLOGY

## 2022-11-03 PROCEDURE — 36004724 HC OB OR TIME LEV III - 1ST 15 MIN: Performed by: OBSTETRICS & GYNECOLOGY

## 2022-11-03 PROCEDURE — 37000009 HC ANESTHESIA EA ADD 15 MINS: Performed by: OBSTETRICS & GYNECOLOGY

## 2022-11-03 PROCEDURE — 59514 CESAREAN DELIVERY ONLY: CPT | Mod: GB,,, | Performed by: OBSTETRICS & GYNECOLOGY

## 2022-11-03 PROCEDURE — 63600175 PHARM REV CODE 636 W HCPCS: Performed by: NURSE ANESTHETIST, CERTIFIED REGISTERED

## 2022-11-03 PROCEDURE — 63600175 PHARM REV CODE 636 W HCPCS: Performed by: MIDWIFE

## 2022-11-03 PROCEDURE — 80307 DRUG TEST PRSMV CHEM ANLYZR: CPT | Performed by: OBSTETRICS & GYNECOLOGY

## 2022-11-03 PROCEDURE — 86703 HIV-1/HIV-2 1 RESULT ANTBDY: CPT | Performed by: MIDWIFE

## 2022-11-03 PROCEDURE — 71000039 HC RECOVERY, EACH ADD'L HOUR: Performed by: OBSTETRICS & GYNECOLOGY

## 2022-11-03 PROCEDURE — 86850 RBC ANTIBODY SCREEN: CPT | Performed by: MIDWIFE

## 2022-11-03 PROCEDURE — 59514 PR CESAREAN DELIVERY ONLY: ICD-10-PCS | Mod: GB,,, | Performed by: OBSTETRICS & GYNECOLOGY

## 2022-11-03 PROCEDURE — 37000008 HC ANESTHESIA 1ST 15 MINUTES: Performed by: OBSTETRICS & GYNECOLOGY

## 2022-11-03 PROCEDURE — 59514 PR CESAREAN DELIVERY ONLY: ICD-10-PCS | Mod: AS,GB,, | Performed by: PHYSICIAN ASSISTANT

## 2022-11-03 PROCEDURE — 63600175 PHARM REV CODE 636 W HCPCS: Performed by: OBSTETRICS & GYNECOLOGY

## 2022-11-03 PROCEDURE — 51702 INSERT TEMP BLADDER CATH: CPT

## 2022-11-03 PROCEDURE — 36004725 HC OB OR TIME LEV III - EA ADD 15 MIN: Performed by: OBSTETRICS & GYNECOLOGY

## 2022-11-03 PROCEDURE — 86592 SYPHILIS TEST NON-TREP QUAL: CPT | Performed by: MIDWIFE

## 2022-11-03 RX ORDER — SODIUM CITRATE AND CITRIC ACID MONOHYDRATE 334; 500 MG/5ML; MG/5ML
30 SOLUTION ORAL
Status: DISCONTINUED | OUTPATIENT
Start: 2022-11-03 | End: 2022-11-03

## 2022-11-03 RX ORDER — MISOPROSTOL 200 UG/1
800 TABLET ORAL
Status: DISCONTINUED | OUTPATIENT
Start: 2022-11-03 | End: 2022-11-03

## 2022-11-03 RX ORDER — PROCHLORPERAZINE EDISYLATE 5 MG/ML
5 INJECTION INTRAMUSCULAR; INTRAVENOUS EVERY 6 HOURS PRN
Status: DISCONTINUED | OUTPATIENT
Start: 2022-11-03 | End: 2022-11-03

## 2022-11-03 RX ORDER — HYDROMORPHONE HYDROCHLORIDE 2 MG/ML
1 INJECTION, SOLUTION INTRAMUSCULAR; INTRAVENOUS; SUBCUTANEOUS EVERY 4 HOURS PRN
Status: DISCONTINUED | OUTPATIENT
Start: 2022-11-03 | End: 2022-11-05 | Stop reason: HOSPADM

## 2022-11-03 RX ORDER — ACETAMINOPHEN 500 MG
500 TABLET ORAL EVERY 6 HOURS PRN
Status: DISCONTINUED | OUTPATIENT
Start: 2022-11-03 | End: 2022-11-03

## 2022-11-03 RX ORDER — NAPROXEN 500 MG/1
500 TABLET ORAL EVERY 8 HOURS
Status: DISCONTINUED | OUTPATIENT
Start: 2022-11-04 | End: 2022-11-05 | Stop reason: HOSPADM

## 2022-11-03 RX ORDER — DEXAMETHASONE SODIUM PHOSPHATE 4 MG/ML
INJECTION, SOLUTION INTRA-ARTICULAR; INTRALESIONAL; INTRAMUSCULAR; INTRAVENOUS; SOFT TISSUE
Status: DISCONTINUED | OUTPATIENT
Start: 2022-11-03 | End: 2022-11-03

## 2022-11-03 RX ORDER — SODIUM CHLORIDE 0.9 % (FLUSH) 0.9 %
10 SYRINGE (ML) INJECTION
Status: DISCONTINUED | OUTPATIENT
Start: 2022-11-03 | End: 2022-11-05 | Stop reason: HOSPADM

## 2022-11-03 RX ORDER — SODIUM CHLORIDE, SODIUM LACTATE, POTASSIUM CHLORIDE, CALCIUM CHLORIDE 600; 310; 30; 20 MG/100ML; MG/100ML; MG/100ML; MG/100ML
INJECTION, SOLUTION INTRAVENOUS CONTINUOUS
Status: DISCONTINUED | OUTPATIENT
Start: 2022-11-03 | End: 2022-11-03

## 2022-11-03 RX ORDER — HYDROCODONE BITARTRATE AND ACETAMINOPHEN 10; 325 MG/1; MG/1
1 TABLET ORAL EVERY 4 HOURS PRN
Status: DISCONTINUED | OUTPATIENT
Start: 2022-11-03 | End: 2022-11-05 | Stop reason: HOSPADM

## 2022-11-03 RX ORDER — DIPHENHYDRAMINE HCL 25 MG
25 CAPSULE ORAL EVERY 4 HOURS PRN
Status: DISCONTINUED | OUTPATIENT
Start: 2022-11-03 | End: 2022-11-05 | Stop reason: HOSPADM

## 2022-11-03 RX ORDER — HYDROCODONE BITARTRATE AND ACETAMINOPHEN 5; 325 MG/1; MG/1
1 TABLET ORAL EVERY 4 HOURS PRN
Status: DISCONTINUED | OUTPATIENT
Start: 2022-11-03 | End: 2022-11-05 | Stop reason: HOSPADM

## 2022-11-03 RX ORDER — PHENYLEPHRINE HYDROCHLORIDE 10 MG/ML
INJECTION INTRAVENOUS
Status: DISCONTINUED | OUTPATIENT
Start: 2022-11-03 | End: 2022-11-03

## 2022-11-03 RX ORDER — DEXMEDETOMIDINE HYDROCHLORIDE 100 UG/ML
INJECTION, SOLUTION INTRAVENOUS
Status: DISCONTINUED | OUTPATIENT
Start: 2022-11-03 | End: 2022-11-03

## 2022-11-03 RX ORDER — ONDANSETRON 2 MG/ML
4 INJECTION INTRAMUSCULAR; INTRAVENOUS EVERY 12 HOURS PRN
Status: DISCONTINUED | OUTPATIENT
Start: 2022-11-03 | End: 2022-11-05 | Stop reason: HOSPADM

## 2022-11-03 RX ORDER — AMMONIA 15 % (W/V)
0.3 AMPUL (EA) INHALATION CONTINUOUS PRN
Status: DISCONTINUED | OUTPATIENT
Start: 2022-11-03 | End: 2022-11-05 | Stop reason: HOSPADM

## 2022-11-03 RX ORDER — KETOROLAC TROMETHAMINE 30 MG/ML
INJECTION, SOLUTION INTRAMUSCULAR; INTRAVENOUS
Status: DISCONTINUED | OUTPATIENT
Start: 2022-11-03 | End: 2022-11-03

## 2022-11-03 RX ORDER — DOCUSATE SODIUM 100 MG/1
100 CAPSULE, LIQUID FILLED ORAL DAILY
Status: DISCONTINUED | OUTPATIENT
Start: 2022-11-03 | End: 2022-11-05 | Stop reason: HOSPADM

## 2022-11-03 RX ORDER — ONDANSETRON 2 MG/ML
INJECTION INTRAMUSCULAR; INTRAVENOUS
Status: DISCONTINUED | OUTPATIENT
Start: 2022-11-03 | End: 2022-11-03

## 2022-11-03 RX ORDER — ACETAMINOPHEN 325 MG/1
650 TABLET ORAL EVERY 6 HOURS PRN
Status: DISCONTINUED | OUTPATIENT
Start: 2022-11-03 | End: 2022-11-05 | Stop reason: HOSPADM

## 2022-11-03 RX ORDER — FAMOTIDINE 10 MG/ML
20 INJECTION INTRAVENOUS
Status: DISCONTINUED | OUTPATIENT
Start: 2022-11-03 | End: 2022-11-03

## 2022-11-03 RX ORDER — ONDANSETRON 8 MG/1
8 TABLET, ORALLY DISINTEGRATING ORAL EVERY 8 HOURS PRN
Status: DISCONTINUED | OUTPATIENT
Start: 2022-11-03 | End: 2022-11-03

## 2022-11-03 RX ORDER — BUTORPHANOL TARTRATE 2 MG/ML
2 INJECTION INTRAMUSCULAR; INTRAVENOUS ONCE
Status: DISCONTINUED | OUTPATIENT
Start: 2022-11-03 | End: 2022-11-03

## 2022-11-03 RX ORDER — OXYTOCIN/RINGER'S LACTATE 30/500 ML
PLASTIC BAG, INJECTION (ML) INTRAVENOUS CONTINUOUS PRN
Status: DISCONTINUED | OUTPATIENT
Start: 2022-11-03 | End: 2022-11-03

## 2022-11-03 RX ORDER — CARBOPROST TROMETHAMINE 250 UG/ML
250 INJECTION, SOLUTION INTRAMUSCULAR
Status: DISCONTINUED | OUTPATIENT
Start: 2022-11-03 | End: 2022-11-03

## 2022-11-03 RX ORDER — ONDANSETRON 8 MG/1
8 TABLET, ORALLY DISINTEGRATING ORAL EVERY 8 HOURS PRN
Status: DISCONTINUED | OUTPATIENT
Start: 2022-11-03 | End: 2022-11-05 | Stop reason: HOSPADM

## 2022-11-03 RX ORDER — OXYTOCIN/RINGER'S LACTATE 30/500 ML
95 PLASTIC BAG, INJECTION (ML) INTRAVENOUS ONCE
Status: DISCONTINUED | OUTPATIENT
Start: 2022-11-03 | End: 2022-11-05 | Stop reason: HOSPADM

## 2022-11-03 RX ORDER — BUPIVACAINE HYDROCHLORIDE 7.5 MG/ML
INJECTION, SOLUTION EPIDURAL; RETROBULBAR
Status: DISCONTINUED | OUTPATIENT
Start: 2022-11-03 | End: 2022-11-03

## 2022-11-03 RX ORDER — PROCHLORPERAZINE EDISYLATE 5 MG/ML
5 INJECTION INTRAMUSCULAR; INTRAVENOUS EVERY 6 HOURS PRN
Status: DISCONTINUED | OUTPATIENT
Start: 2022-11-03 | End: 2022-11-05 | Stop reason: HOSPADM

## 2022-11-03 RX ORDER — KETOROLAC TROMETHAMINE 30 MG/ML
30 INJECTION, SOLUTION INTRAMUSCULAR; INTRAVENOUS EVERY 8 HOURS
Status: COMPLETED | OUTPATIENT
Start: 2022-11-03 | End: 2022-11-04

## 2022-11-03 RX ORDER — OXYTOCIN/RINGER'S LACTATE 30/500 ML
334 PLASTIC BAG, INJECTION (ML) INTRAVENOUS ONCE
Status: COMPLETED | OUTPATIENT
Start: 2022-11-03 | End: 2022-11-03

## 2022-11-03 RX ORDER — BUPIVACAINE HYDROCHLORIDE AND EPINEPHRINE 5; 5 MG/ML; UG/ML
INJECTION, SOLUTION EPIDURAL; INTRACAUDAL; PERINEURAL
Status: DISCONTINUED | OUTPATIENT
Start: 2022-11-03 | End: 2022-11-03

## 2022-11-03 RX ORDER — METHYLERGONOVINE MALEATE 0.2 MG/ML
200 INJECTION INTRAVENOUS
Status: DISCONTINUED | OUTPATIENT
Start: 2022-11-03 | End: 2022-11-03

## 2022-11-03 RX ORDER — DIPHENHYDRAMINE HYDROCHLORIDE 50 MG/ML
25 INJECTION INTRAMUSCULAR; INTRAVENOUS EVERY 4 HOURS PRN
Status: DISCONTINUED | OUTPATIENT
Start: 2022-11-03 | End: 2022-11-05 | Stop reason: HOSPADM

## 2022-11-03 RX ORDER — OXYTOCIN/RINGER'S LACTATE 30/500 ML
95 PLASTIC BAG, INJECTION (ML) INTRAVENOUS ONCE
Status: DISCONTINUED | OUTPATIENT
Start: 2022-11-03 | End: 2022-11-03

## 2022-11-03 RX ORDER — LANOLIN ALCOHOL/MO/W.PET/CERES
1 CREAM (GRAM) TOPICAL DAILY
Status: DISCONTINUED | OUTPATIENT
Start: 2022-11-03 | End: 2022-11-05 | Stop reason: HOSPADM

## 2022-11-03 RX ORDER — SODIUM CHLORIDE, SODIUM LACTATE, POTASSIUM CHLORIDE, CALCIUM CHLORIDE 600; 310; 30; 20 MG/100ML; MG/100ML; MG/100ML; MG/100ML
INJECTION, SOLUTION INTRAVENOUS CONTINUOUS PRN
Status: DISCONTINUED | OUTPATIENT
Start: 2022-11-03 | End: 2022-11-03

## 2022-11-03 RX ORDER — PRENATAL WITH FERROUS FUM AND FOLIC ACID 3080; 920; 120; 400; 22; 1.84; 3; 20; 10; 1; 12; 200; 27; 25; 2 [IU]/1; [IU]/1; MG/1; [IU]/1; MG/1; MG/1; MG/1; MG/1; MG/1; MG/1; UG/1; MG/1; MG/1; MG/1; MG/1
1 TABLET ORAL DAILY
Status: DISCONTINUED | OUTPATIENT
Start: 2022-11-03 | End: 2022-11-05 | Stop reason: HOSPADM

## 2022-11-03 RX ADMIN — KETOROLAC TROMETHAMINE 30 MG: 30 INJECTION, SOLUTION INTRAMUSCULAR at 02:11

## 2022-11-03 RX ADMIN — Medication 334 MILLI-UNITS/MIN: at 03:11

## 2022-11-03 RX ADMIN — ONDANSETRON 8 MG: 2 INJECTION, SOLUTION INTRAMUSCULAR; INTRAVENOUS at 12:11

## 2022-11-03 RX ADMIN — PHENYLEPHRINE HYDROCHLORIDE 200 MCG: 10 INJECTION INTRAVENOUS at 01:11

## 2022-11-03 RX ADMIN — DEXMEDETOMIDINE HYDROCHLORIDE 40 MCG: 100 INJECTION, SOLUTION INTRAVENOUS at 02:11

## 2022-11-03 RX ADMIN — FERROUS SULFATE TAB 325 MG (65 MG ELEMENTAL FE) 1 EACH: 325 (65 FE) TAB at 05:11

## 2022-11-03 RX ADMIN — KETOROLAC TROMETHAMINE 30 MG: 30 INJECTION, SOLUTION INTRAMUSCULAR; INTRAVENOUS at 09:11

## 2022-11-03 RX ADMIN — PRENATAL VITAMINS-IRON FUMARATE 27 MG IRON-FOLIC ACID 0.8 MG TABLET 1 TABLET: at 05:11

## 2022-11-03 RX ADMIN — HYDROCODONE BITARTRATE AND ACETAMINOPHEN 1 TABLET: 10; 325 TABLET ORAL at 10:11

## 2022-11-03 RX ADMIN — SODIUM CHLORIDE, SODIUM LACTATE, POTASSIUM CHLORIDE, AND CALCIUM CHLORIDE: 600; 310; 30; 20 INJECTION, SOLUTION INTRAVENOUS at 12:11

## 2022-11-03 RX ADMIN — BUPIVACAINE HYDROCHLORIDE 1.7 ML: 7.5 INJECTION, SOLUTION EPIDURAL; RETROBULBAR at 01:11

## 2022-11-03 RX ADMIN — AZITHROMYCIN MONOHYDRATE 500 MG: 500 INJECTION, POWDER, LYOPHILIZED, FOR SOLUTION INTRAVENOUS at 01:11

## 2022-11-03 RX ADMIN — Medication 334 ML/HR: at 01:11

## 2022-11-03 RX ADMIN — BUPIVACAINE HYDROCHLORIDE AND EPINEPHRINE BITARTRATE 30 ML: 5; .005 INJECTION, SOLUTION EPIDURAL; INTRACAUDAL; PERINEURAL at 02:11

## 2022-11-03 RX ADMIN — PHENYLEPHRINE HYDROCHLORIDE 200 MCG: 10 INJECTION INTRAVENOUS at 02:11

## 2022-11-03 RX ADMIN — DEXMEDETOMIDINE HYDROCHLORIDE 5 MCG: 100 INJECTION, SOLUTION, CONCENTRATE INTRAVENOUS at 01:11

## 2022-11-03 RX ADMIN — DEXAMETHASONE SODIUM PHOSPHATE 8 MG: 4 INJECTION, SOLUTION INTRA-ARTICULAR; INTRALESIONAL; INTRAMUSCULAR; INTRAVENOUS; SOFT TISSUE at 02:11

## 2022-11-03 RX ADMIN — SODIUM CHLORIDE, SODIUM LACTATE, POTASSIUM CHLORIDE, AND CALCIUM CHLORIDE 1000 ML: .6; .31; .03; .02 INJECTION, SOLUTION INTRAVENOUS at 03:11

## 2022-11-03 NOTE — ANESTHESIA PREPROCEDURE EVALUATION
2022  Kelsi Ngo is a 31 y.o., female.      Pre-op Assessment    I have reviewed the Patient Summary Reports.     I have reviewed the Nursing Notes.    I have reviewed the Medications.     Review of Systems  Anesthesia Hx:  Neg history of prior surgery. Denies Family Hx of Anesthesia complications.   Denies Personal Hx of Anesthesia complications.   OB/GYN/PEDS:  Planned Repeat         Physical Exam  General: Well nourished, Cooperative and Alert    Airway:  Mallampati: II   Mouth Opening: Normal  TM Distance: Normal  Tongue: Normal  Neck ROM: Normal ROM    Dental:  Intact    Chest/Lungs:  Clear to auscultation, Normal Respiratory Rate    Heart:  Rate: Normal  Rhythm: Regular Rhythm  Sounds: Normal        Anesthesia Plan  Type of Anesthesia, risks & benefits discussed:    Anesthesia Type: Epidural  Intra-op Monitoring Plan: Standard ASA Monitors  Post Op Pain Control Plan: epidural analgesia  Informed Consent: Informed consent signed with the Patient and all parties understand the risks and agree with anesthesia plan.  All questions answered. Patient consented to blood products? Yes  ASA Score: 2  Day of Surgery Review of History & Physical: H&P Update referred to the surgeon/provider.    Ready For Surgery From Anesthesia Perspective.     .

## 2022-11-03 NOTE — ANESTHESIA PROCEDURE NOTES
Peripheral Block    Patient location during procedure: OR   Block not for primary anesthetic.  Reason for block: at surgeon's request and post-op pain management   Post-op Pain Location: Midline lower ABD   Start time: 11/3/2022 2:25 PM  Timeout: 11/3/2022 2:25 PM   End time: 11/3/2022 2:30 PM    Staffing  Authorizing Provider: River Nicole MD  Performing Provider: Deny Null CRNA    Preanesthetic Checklist  Completed: patient identified, IV checked, site marked, risks and benefits discussed, surgical consent, monitors and equipment checked, pre-op evaluation and timeout performed  Peripheral Block  Patient position: supine  Prep: ChloraPrep  Patient monitoring: heart rate, cardiac monitor, continuous pulse ox and frequent blood pressure checks  Block type: TAP  Laterality: bilateral  Injection technique: single shot  Needle  Needle type: Stimuplex   Needle gauge: 22 G  Needle length: 2 in  Needle localization: anatomical landmarks and ultrasound guidance   -ultrasound image captured on disc.  Assessment  Injection assessment: negative aspiration and negative parasthesia  Heart rate change: no  Slow fractionated injection: yes  Pain Tolerance: comfortable throughout block and no complaints      Additional Notes  NO APPARENT ANESTHESIA COMPLICATIONS

## 2022-11-03 NOTE — ASSESSMENT & PLAN NOTE
Continuous monitoring  Documented hx of classical  section in op note from [facility] with previous del of  twins. Dr. Flores called and notified. Will prep patient and proceed to OR for repeat c/s. Pt agreeable to POC.

## 2022-11-03 NOTE — ANESTHESIA POSTPROCEDURE EVALUATION
Anesthesia Post Evaluation    Patient: Kelsi Ngo    Procedure(s) Performed: Procedure(s) (LRB):   SECTION (N/A)    Final Anesthesia Type: spinal      Patient location during evaluation: labor & delivery  Patient participation: Yes- Able to Participate  Level of consciousness: awake and alert, awake and oriented  Post-procedure vital signs: reviewed and stable  Pain management: adequate  Airway patency: patent    PONV status at discharge: No PONV  Anesthetic complications: no      Cardiovascular status: blood pressure returned to baseline and hemodynamically stable  Respiratory status: unassisted and spontaneous ventilation  Hydration status: euvolemic  Follow-up not needed.          Vitals Value Taken Time   /46 22 1433   Temp 36.3 °C (97.3 °F) 22 1433   Pulse 70 22 1433   Resp 18 22 1433   SpO2 96 % 22 1433         No case tracking events are documented in the log.      Pain/Belinda Score: No data recorded

## 2022-11-03 NOTE — H&P
[facility]  Obstetrics  History & Physical    Patient Name: Kelsi Ngo  MRN: [id number]  Admission Date: 11/3/2022  Primary Care Provider: Primary Doctor No    Subjective:     Principal Problem:Threatened labor at term    History of Present Illness:  31 y.o.  CECILIA 11/10/2022 EGA 39w0d arrived via EMS with c/o contractions every 15 minutes      Obstetric HPI:  Patient reports regular contractions, active fetal movement, No vaginal bleeding , No loss of fluid     This pregnancy has been complicated by:  Previous c/s x 1 with classical incision, anemia, ASCUS pap, limited prenatal care (last visit at 28w), hx of PPROM and  delivery    OB History    Para Term  AB Living   8 4 3 1 3 5   SAB IAB Ectopic Multiple Live Births   3 0 0 1 5      # Outcome Date GA Lbr Dilshad/2nd Weight Sex Delivery Anes PTL Lv   8 Current            7A  21 33w0d  1.474 kg (3 lb 4 oz) F CS-LTranv EPI  JAVI   7B     1.474 kg (3 lb 4 oz) M CS-LTranv   JAVI   6 SAB 20           5 Term 08/10/18 39w0d   F Vag-Spont EPI N JAVI   4 Term 17 39w0d   F Vag-Spont EPI N JAVI   3 SAB 10/30/11           2 2011           1 Term 11/26/10    F Vag-Spont EPI N JAVI     Past Medical History:   Diagnosis Date    Heart murmur      Past Surgical History:   Procedure Laterality Date     SECTION      emergency c section 2021       PTA Medications   Medication Sig    docusate sodium (COLACE) 100 MG capsule Take 1 capsule (100 mg total) by mouth 2 (two) times daily as needed for Constipation.    ferrous sulfate 325 (65 FE) MG EC tablet Take 1 tablet (325 mg total) by mouth 2 (two) times daily.    ondansetron (ZOFRAN) 4 MG tablet Take 1 tablet (4 mg total) by mouth daily as needed for Nausea.    prenatal vit 14-iron fum-folic (COMPLETENATE) 29 mg iron- 1 mg Chew Take 1 tablet by mouth once daily.       Review of patient's allergies indicates:   Allergen Reactions    Ibuprofen Itching         Family History       Problem Relation (Age of Onset)    Cancer Paternal Grandmother, Mother    Diabetes Paternal Grandmother    Hypertension Paternal Grandmother, Father, Mother, Maternal Grandmother          Tobacco Use    Smoking status: Never    Smokeless tobacco: Never   Substance and Sexual Activity    Alcohol use: Never    Drug use: Yes     Types: Marijuana    Sexual activity: Yes     Partners: Male     Birth control/protection: None     Review of Systems   Gastrointestinal:  Positive for abdominal pain.   Musculoskeletal:  Positive for back pain.    Objective:     Vital Signs (Most Recent):    Vital Signs (24h Range):           There is no height or weight on file to calculate BMI.    FHT: 140 Cat 1 (reassuring)  TOCO:  Q 10-15 minutes    Physical Exam:   Constitutional: She is oriented to person, place, and time. She appears well-developed and well-nourished.    HENT:   Head: Normocephalic.    Eyes: Conjunctivae are normal.      Pulmonary/Chest: Effort normal.        Abdominal: Soft.             Musculoskeletal: Normal range of motion.       Neurological: She is alert and oriented to person, place, and time.    Skin: Skin is warm and dry.    Psychiatric: She has a normal mood and affect. Her behavior is normal. Judgment and thought content normal.     Cervix:per RN  Dilation:  3  Effacement:  60  Station: -3  Presentation: Vertex     Significant Labs:  Lab Results   Component Value Date    GROUPTRH O POS 2022    HEPBSAG Negative 2022       I have personallly reviewed all pertinent lab results from the last 24 hours.    Assessment/Plan:     31 y.o. female  at 39w0d for:    * Threatened labor at term  Continuous monitoring  Documented hx of classical  section in op note from [facility] with previous del of  twins. Dr. Flores called and notified. Will prep patient and proceed to OR for repeat c/s. Pt agreeable to POC.     Limited prenatal care in third  trimester  Last prenatal visit at 28w    History of  delivery, currently pregnant  Continuous monitoring  Documented hx of classical  section in op note from [facility] with previous del of  twins. Dr. Flores called and notified. Will prep patient and proceed to OR for repeat c/s. Pt agreeable to POC.     Anemia during pregnancy in first trimester  CBC ordered        Leonor Duke CNM  Obstetrics  [facility]

## 2022-11-03 NOTE — LACTATION NOTE
This note was copied from a baby's chart.  Discussed practices that support optimal maternity care and  feeding such as immediate skin to skin, the magic first hour, the importance of the first feeding, and delaying routine procedures. Also discussed continued skin to skin contact, rooming-in, and feeding on cue. Discussed feeding choice with mother. Reviewed benefits of breastfeeding and risks of formula feeding. Mother states her intention is breastfeeding    Discussed early feeding cues and encouraged mother to feed baby in response to those cues. Encouraged unrestricted feedings rather than timed/amount limits, procedural schedules, or visitation schedules. Reviewed normal feeding expectations of 8 or more feedings per 24 hour period, cues that babies use to signal hunger and satiety, and the importance of physical contact during feeding.

## 2022-11-03 NOTE — OP NOTE
"[facility] - Labor [facility] Delivery   Section   Operative Note    SUMMARY     Date of Procedure: 11/3/2022     Procedure: Procedure(s) (LRB):   SECTION (N/A)    Surgeon(s) and Role:     * Jennifer Flores MD - Primary    Assistant:  Seema Mckinney PA-C, assistance necessary for completion of the surgery    Pre-Operative Diagnosis:    IUP at 39wga  Active labor  History of classical  delivery  Limited prenatal care    Post-Operative Diagnosis:    IUP at 39wga  Active labor  History of classical  delivery  Limited prenatal care    Anesthesia: Spinal/Epidural    Technical Procedures Used: repeat low transverse  delivery via Pfannensteil skin incision           Description of the Findings of the Procedure: Normal uterus, tubes, and ovaries.  Dense, 1.5cm thick adhesive band between the anterior uterus and the abdominal wall taken down with the bovie.  Clear amniotic fluid.  Male infant, "Holland", cephalic presentation, 6lb3oz, APGARS 8/9.  3 vessel cord.  Placenta delivered spontaneously, intact    Significant Surgical Tasks Conducted by the Assistant(s), if Applicable: retraction, exposure, skin closure    Complications: No    Blood Loss: * 600 mL *     With patient in supine position, the legs are  and Cruz Catheter placed and positioning to supine done.   Abdomen prepped with Chloroprep and 3 minute drying time allowed prior to draping of the abdomen.   Time out taken with OR team members.  Thick scar from prior Pfannensteil noted.  Pfannenstiel Incision made through the skin with the scalpel superior and inferior to the scar, and thick scar was excised with the scalpel.  Subcutaneous layer taken down with the bovie, transverse fascial incision developed, rectus muscles  in the midline and the peritoneum entered.   Uterine adhesion band noted.  I was able to wrap my finger around the band and take it down with the Bovie.  The lower uterine segment and position of the " fetus identified.   Bladder flap taken down through transverse peritoneal incision.    Low Transverse Incision made through well developer lower uterine segment and extended laterally with blunt dissection.   Clear fluid noted.  Infant delivered from vertex presentation.  Cord clamped after one minute and  handed to attending nurse.  Cord blood taken, placenta delivered.  The uterus wasnot exteriorized.  The edges of the uterine incision are grasped with Padron clamps at the angles and the inferior and superior midline edges of the incision.    Closure with running lock 0 Chromic, starting at each angle, tying in the midline.   Observation for bleeding with suture of any bleeding along the hysterotomy line.   With good hemostasis noted, the anterior pelvis is rinsed with sterile saline.   Right and left adnexa with normal anatomy.     Closure of the abdomen with 2 0 Vicryl horizontal mattress of the rectus muscles, fascial closure with 0 looped PDS starting at each angle and tying the knot in the midline.  Skin closure with 4 0 Monocryl subcuticular.  Wound dressed with Aquasel and pressure dressing.          Specimens:   Specimen (24h ago, onward)      None            Condition: Good    Disposition: PACU - hemodynamically stable.    Attestation: Good

## 2022-11-03 NOTE — ANESTHESIA PROCEDURE NOTES
Spinal    Diagnosis: IUP repeat CS  Patient location during procedure: OR  Start time: 11/3/2022 1:04 PM  Timeout: 11/3/2022 1:04 PM  End time: 11/3/2022 1:10 PM    Staffing  Authorizing Provider: Deny Null CRNA  Performing Provider: Deny Null CRNA    Preanesthetic Checklist  Completed: patient identified, IV checked, site marked, risks and benefits discussed, surgical consent, monitors and equipment checked, pre-op evaluation and timeout performed  Spinal Block  Patient position: sitting  Prep: Betadine  Patient monitoring: heart rate, cardiac monitor and continuous pulse ox  Approach: midline  Location: L3-4  Injection technique: single shot  CSF Fluid: clear free-flowing CSF  Needle  Needle type: Christel   Needle gauge: 25 G  Needle length: 3.5 in  Additional Documentation: no paresthesia on injection and negative aspiration for heme  Needle localization: anatomical landmarks  Assessment  Ease of block: easy  Patient's tolerance of the procedure: comfortable throughout block and no complaints

## 2022-11-03 NOTE — L&D DELIVERY NOTE
"[facility] - Labor [facility] Delivery   Section   Operative Note    SUMMARY     Date of Procedure: 11/3/2022     Procedure: Procedure(s) (LRB):   SECTION (N/A)    Surgeon(s) and Role:     * Jennifer Flores MD - Primary    Assistant:  Seema Mckinney PA-C, assistance necessary for completion of the surgery    Pre-Operative Diagnosis:    IUP at 39wga  Active labor  History of classical  delivery  Limited prenatal care    Post-Operative Diagnosis:    IUP at 39wga  Active labor  History of classical  delivery  Limited prenatal care    Anesthesia: Spinal/Epidural    Technical Procedures Used: repeat low transverse  delivery via Pfannensteil skin incision           Description of the Findings of the Procedure: Normal uterus, tubes, and ovaries.  Dense, 1.5cm thick adhesive band between the anterior uterus and the abdominal wall taken down with the bovie.  Clear amniotic fluid.  Male infant, "Holland", cephalic presentation, 6lb3oz, APGARS 8/9.  3 vessel cord.  Placenta delivered spontaneously, intact    Significant Surgical Tasks Conducted by the Assistant(s), if Applicable: retraction, exposure, skin closure    Complications: No    Blood Loss: * 600 mL *     With patient in supine position, the legs are  and Cruz Catheter placed and positioning to supine done.   Abdomen prepped with Chloroprep and 3 minute drying time allowed prior to draping of the abdomen.   Time out taken with OR team members.  Thick scar from prior Pfannensteil noted.  Pfannenstiel Incision made through the skin with the scalpel superior and inferior to the scar, and thick scar was excised with the scalpel.  Subcutaneous layer taken down with the bovie, transverse fascial incision developed, rectus muscles  in the midline and the peritoneum entered.   Uterine  adhesion band noted.  I was able to wrap my finger around the band and take it down with the Bovie.  The lower uterine segment and position of " the fetus identified.   Bladder flap taken down through transverse peritoneal incision.    Low Transverse Incision made through well developer lower uterine segment and extended laterally with blunt dissection.   Clear fluid noted.  Infant delivered from vertex presentation.  Cord clamped after one minute and  handed to attending nurse.  Cord blood taken, placenta delivered.  The uterus wasnot exteriorized.  The edges of the uterine incision are grasped with Padron clamps at the angles and the inferior and superior midline edges of the incision.    Closure with running lock 0 Chromic, starting at each angle, tying in the midline.   Observation for bleeding with suture of any bleeding along the hysterotomy line.   With good hemostasis noted, the anterior pelvis is rinsed with sterile saline.   Right and left adnexa with normal anatomy.     Closure of the abdomen with 2 0 Vicryl horizontal mattress of the rectus muscles, fascial closure with 0 looped PDS starting at each angle and tying the knot in the midline.  Skin closure with 4 0 Monocryl subcuticular.  Wound dressed with Aquasel and pressure dressing.          Specimens:   Specimen (24h ago, onward)      None            Condition: Good    Disposition: PACU - hemodynamically stable.    Attestation: Good         Delivery Information for Home Ngo    Birth information:  YOB: 2021   Time of birth: 1:38 PM   Sex: male   Head Delivery Date/Time: 11/3/2022  1:38 PM   Delivery type: , Low Transverse   Gestational Age: 39w0d    Delivery Providers    Delivering clinician: Jennifer Flores MD   Provider Role    Izzy Hurt RN Registered Nurse    Lisette Boyd RN Registered Nurse    Angeline Gordon RN Registered Nurse    Seema Mckinney PA-C Physician Assistant    Deny Null, CRNA Nurse Anesthetist    [facility]              Measurements    Weight: 2807 g  Weight (lbs): 6 lb 3  oz  Length:          Apgars    Living status: Living  Apgars:  1 min.:  5 min.:  10 min.:  15 min.:  20 min.:    Skin color:  0  1       Heart rate:  2  2       Reflex irritability:  2  2       Muscle tone:  2  2       Respiratory effort:  2  2       Total:  8  9       Apgars assigned by: KENIA DENG RN         Operative Delivery    Forceps attempted?: No  Vacuum extractor attempted?: No         Shoulder Dystocia    Shoulder dystocia present?: No           Presentation    Presentation: Vertex           Interventions/Resuscitation    Method: Bulb Suctioning       Cord    Vessels: 3 vessels  Complications: None  Delayed Cord Clamping?: Yes  Cord Clamped Date/Time: 11/3/2022  1:40 PM  Cord Blood Disposition: Lab  Gases Sent?: No  Stem Cell Collection (by MD): No       Placenta    Placenta delivery date/time: 11/3/2022 1342  Placenta removal: Spontaneous  Placenta appearance: Intact  Placenta disposition: discarded           Labor Events:       labor: No     Labor Onset Date/Time:         Dilation Complete Date/Time:         Start Pushing Date/Time:         Start Pushing Date/Time:       Rupture Date/Time:            Rupture type:            Fluid Amount:         Fluid Color:         Fluid Odor:         Membrane Status:                  steroids: None     Antibiotics given for GBS: No     Induction: none     Indications for induction:        Augmentation:       Indications for augmentation:       Labor complications: None     Additional complications:          Cervical ripening:                     Delivery:      Episiotomy:       Indication for Episiotomy:       Perineal Lacerations:   Repaired:      Periurethral Laceration:   Repaired:     Labial Laceration:   Repaired:     Sulcus Laceration:   Repaired:     Vaginal Laceration:   Repaired:     Cervical Laceration:   Repaired:     Repair suture:       Repair # of packets:       Last Value - EBL - Nursing (mL):       Sum - EBL - Nursing (mL): 0      Last Value - EBL - Anesthesia (mL):        Calculated QBL (mL):         Vaginal Sweep Performed:       Surgicount Correct:         Other providers:       Anesthesia    Method: Spinal          Details (if applicable):  Trial of Labor No    Categorization: Repeat    Priority: Routine   Indications for : Repeat Section   Incision Type: low transverse     Additional  information:  Forceps:    Vacuum:    Breech:    Observed anomalies    Other (Comments):

## 2022-11-03 NOTE — HOSPITAL COURSE
Continuous monitoring  Documented hx of classical  section in op note from [facility] with previous del of  twins. Dr. Flores called and notified. Will prep patient and proceed to OR for repeat c/s. Pt agreeable to POC.   22: POD 1 s/p RCS. Tolerated surgery well, now on mother/baby unit. Doing well this morning.   22, stable for discharge pod #2

## 2022-11-03 NOTE — ASSESSMENT & PLAN NOTE
Continuous monitoring  Documented hx of classical  section in op note from [facility] with previous del of  twins. Dr. Flores called and notified. Will prep patient and proceed to OR for repeat c/s. Pt agreeable to POC.

## 2022-11-04 PROBLEM — O99.320 MARIJUANA USE DURING PREGNANCY: Status: RESOLVED | Noted: 2022-11-03 | Resolved: 2022-11-04

## 2022-11-04 PROBLEM — F12.90 MARIJUANA USE DURING PREGNANCY: Status: RESOLVED | Noted: 2022-11-03 | Resolved: 2022-11-04

## 2022-11-04 PROBLEM — O47.9 THREATENED LABOR AT TERM: Status: RESOLVED | Noted: 2022-11-03 | Resolved: 2022-11-04

## 2022-11-04 PROBLEM — O09.33 LIMITED PRENATAL CARE IN THIRD TRIMESTER: Status: RESOLVED | Noted: 2022-10-24 | Resolved: 2022-11-04

## 2022-11-04 PROCEDURE — 25000003 PHARM REV CODE 250: Performed by: OBSTETRICS & GYNECOLOGY

## 2022-11-04 PROCEDURE — 63600175 PHARM REV CODE 636 W HCPCS: Performed by: OBSTETRICS & GYNECOLOGY

## 2022-11-04 PROCEDURE — 99231 PR SUBSEQUENT HOSPITAL CARE,LEVL I: ICD-10-PCS | Mod: ,,, | Performed by: PHYSICIAN ASSISTANT

## 2022-11-04 PROCEDURE — 11000001 HC ACUTE MED/SURG PRIVATE ROOM

## 2022-11-04 PROCEDURE — 99231 SBSQ HOSP IP/OBS SF/LOW 25: CPT | Mod: ,,, | Performed by: PHYSICIAN ASSISTANT

## 2022-11-04 RX ADMIN — PRENATAL VITAMINS-IRON FUMARATE 27 MG IRON-FOLIC ACID 0.8 MG TABLET 1 TABLET: at 08:11

## 2022-11-04 RX ADMIN — HYDROCODONE BITARTRATE AND ACETAMINOPHEN 1 TABLET: 10; 325 TABLET ORAL at 04:11

## 2022-11-04 RX ADMIN — KETOROLAC TROMETHAMINE 30 MG: 30 INJECTION, SOLUTION INTRAMUSCULAR; INTRAVENOUS at 01:11

## 2022-11-04 RX ADMIN — HYDROCODONE BITARTRATE AND ACETAMINOPHEN 1 TABLET: 10; 325 TABLET ORAL at 06:11

## 2022-11-04 RX ADMIN — KETOROLAC TROMETHAMINE 30 MG: 30 INJECTION, SOLUTION INTRAMUSCULAR; INTRAVENOUS at 05:11

## 2022-11-04 RX ADMIN — HYDROCODONE BITARTRATE AND ACETAMINOPHEN 1 TABLET: 10; 325 TABLET ORAL at 12:11

## 2022-11-04 RX ADMIN — FERROUS SULFATE TAB 325 MG (65 MG ELEMENTAL FE) 1 EACH: 325 (65 FE) TAB at 08:11

## 2022-11-04 RX ADMIN — DOCUSATE SODIUM 100 MG: 100 CAPSULE, LIQUID FILLED ORAL at 08:11

## 2022-11-04 RX ADMIN — HYDROCODONE BITARTRATE AND ACETAMINOPHEN 1 TABLET: 10; 325 TABLET ORAL at 08:11

## 2022-11-04 NOTE — PROGRESS NOTES
[facility] ([facility])  Obstetrics  Postpartum Progress Note    Patient Name: Kelsi Ngo  MRN: [id number]  Admission Date: 11/3/2022  Hospital Length of Stay: 1 days  Attending Physician: Jennifer Flores MD  Primary Care Provider: Primary Doctor No    Subjective:     Principal Problem:Status post repeat low transverse  section    Hospital Course:  Continuous monitoring  Documented hx of classical  section in op note from [facility] with previous del of  twins. Dr. Flores called and notified. Will prep patient and proceed to OR for repeat c/s. Pt agreeable to POC.   22: POD 1 s/p RCS. Tolerated surgery well, now on mother/baby unit. Doing well this morning.       Interval History: POD 1 s/p RCS.     She is doing well this morning. She is tolerating a regular diet without nausea or vomiting. She is voiding spontaneously. She is ambulating. She has passed flatus, and has not a BM. Vaginal bleeding is mild. She denies fever or chills. Abdominal pain is mild and controlled with oral medications. She Is breastfeeding.     Objective:     Vital Signs (Most Recent):  Temp: 97.8 °F (36.6 °C) (22)  Pulse: (!) 59 (22)  Resp: 18 (22)  BP: (!) 91/54 (22)  SpO2: 100 % (22)   Vital Signs (24h Range):  Temp:  [97.3 °F (36.3 °C)-98.3 °F (36.8 °C)] 97.8 °F (36.6 °C)  Pulse:  [53-70] 59  Resp:  [16-18] 18  SpO2:  [94 %-100 %] 100 %  BP: ()/(45-70) 91/54        There is no height or weight on file to calculate BMI.      Intake/Output Summary (Last 24 hours) at 2022 0756  Last data filed at 2022 0546  Gross per 24 hour   Intake --   Output 2135 ml   Net -2135 ml         Significant Labs:  Lab Results   Component Value Date    GROUPTRH O POS 2022    HEPBSAG Negative 2022     Recent Labs   Lab 22  1211   HGB 10.0*   HCT 30.8*       I have personallly reviewed all pertinent lab results from the last 24  hours.  Recent Lab Results         11/03/22  1329   11/03/22  1256   11/03/22  1211        Benzodiazepines Negative           Methadone metabolites Negative           Phencyclidine Negative           Amphetamine Screen, Ur Negative           Barbiturate Screen, Ur Negative           Baso #     0.01       Basophil %     0.1       Cocaine (Metab.) Negative           Creatinine, Urine 241.9           Differential Method     Automated       Eos #     0.0       Eosinophil %     0.0       Gran # (ANC)     5.7       Gran %     84.2       Group & Rh     O POS       Hematocrit     30.8       Hemoglobin     10.0       HIV Rapid Testing   Non-Reactive  Comment: Interpretation: Negative for HIV-1 and HIV-2 antibodies.         Immature Grans (Abs)     0.06  Comment: Mild elevation in immature granulocytes is non specific and   can be seen in a variety of conditions including stress response,   acute inflammation, trauma and pregnancy. Correlation with other   laboratory and clinical findings is essential.         Immature Granulocytes     0.9       INDIRECT RUFINO     NEG       Lymph #     0.4       Lymph %     6.1       MCH     29.3       MCHC     32.5       MCV     90       Mono #     0.6       Mono %     8.7       MPV     12.5       nRBC     0       Opiate Scrn, Ur Negative           Platelets     149       RBC     3.41       RDW     15.0       RPR   Non-reactive         Marijuana (THC) Metabolite Presumptive Positive           Toxicology Information SEE COMMENT  Comment: This screen includes the following classes of drugs at the listed   cut-off:    Benzodiazepines 200 ng/ml  Methadone 300 ng/ml  Cocaine metabolite 300 ng/ml  Opiates 300 ng/ml  Barbiturates 200 ng/ml  Amphetamines 1000 ng/ml  Marijuana metabs (THC) 50 ng/ml  Phencyclidine (PCP) 25 ng/ml    This is a screening test. If results do not correlate with clinical   presentation, then a confirmatory send out test is advised.     This report is intended for use in  clinical monitoring and management   of   patients. It is not intended for use in employment related drug   testing.             WBC     6.75               Physical Exam:   Constitutional: She is oriented to person, place, and time. She appears well-developed. No distress.    HENT:   Head: Normocephalic and atraumatic.    Eyes: Conjunctivae are normal. Right eye exhibits no discharge. Left eye exhibits no discharge. No scleral icterus.     Cardiovascular:  Normal rate and regular rhythm.             Pulmonary/Chest: Effort normal and breath sounds normal. No respiratory distress.        Abdominal: Soft. Bowel sounds are normal. She exhibits abdominal incision (Pfannenstiel incision with acquacel dressing in place, CDI. Appropriately TTP.). She exhibits no distension.   Fundus firm and below the level of the umbilicus             Musculoskeletal: Normal range of motion. No edema.       Neurological: She is alert and oriented to person, place, and time.    Skin: Skin is warm and dry. She is not diaphoretic.      Assessment/Plan:     31 y.o. female  for:    * Status post repeat low transverse  section  Underwent RCS on 11/3/22.   - POD 1  - Tolerating PO  - Pain managed on current regimen  - Ambulating, encouraged patient to walk the halls and take a shower today  - Voiding spontaneously  - Doing well      Anemia during pregnancy in third trimester  - Iron PP        Disposition: As patient meets milestones, will plan to discharge accordingly.    Seema Mckinney PA-C  Obstetrics  [facility] ([facility])

## 2022-11-04 NOTE — PLAN OF CARE
[facility] (Hospital)  Discharge Assessment    Primary Care Provider: Primary Doctor No     OB Screen (most recent)       OB Screen - 22 1000          OB SCREEN    Assessment Type Discharge Planning Assessment     Source of Information patient;health record     Received Prenatal Care Yes   Limited    Any indications/suspicions for Substance use/disorder     Is this a teen pregnancy No     Is the baby in NICU No     Indication for adoption/Safe Haven No     Indication for DME/post-acute needs No     HIV (+) No     Any congenital  disorders No     Fetal demise/ death No                   UDS + for THC. Swer will complete OB Discharge Planning Assessment.

## 2022-11-04 NOTE — LACTATION NOTE
Mother states that feedings are going well. Reports that she breast fed her other 5 children for 3 months each.     Lactation packet reviewed for days 1-2.  Discussed early feeding cues and encouraged mother to feed baby in response to those cues. Encouraged on demand feedings and skin to skin.  Reviewed normal feeding expectations of 8 or more feedings per 24 hour period, cues that babies use to signal hunger and satiety and cluster feeding. Discussed the adequacy of colostrum and baby belly size for the first 3 days of life along with expected output.     Discussed risks of introducing a pacifier or artificial nipple and discussed the AAP recommendation to avoid the use of pacifiers until 1 month of age for breastfeeding infants.     Breast pump information provided on how to obtain through insurance.  [place] Department of [employer] form faxed to [email or web address] this time.     Mother states understanding and verbalized appropriate recall. Encouraged mother to call for assistance when desired or when infant is showing signs of hunger, contact number provided, mother verbalizes understanding.

## 2022-11-04 NOTE — SUBJECTIVE & OBJECTIVE
Interval History: POD 1 s/p RCS.     She is doing well this morning. She is tolerating a regular diet without nausea or vomiting. She is voiding spontaneously. She is ambulating. She has passed flatus, and has not a BM. Vaginal bleeding is mild. She denies fever or chills. Abdominal pain is mild and controlled with oral medications. She Is breastfeeding.     Objective:     Vital Signs (Most Recent):  Temp: 97.8 °F (36.6 °C) (11/04/22 0707)  Pulse: (!) 59 (11/04/22 0707)  Resp: 18 (11/04/22 0707)  BP: (!) 91/54 (11/04/22 0707)  SpO2: 100 % (11/04/22 0707)   Vital Signs (24h Range):  Temp:  [97.3 °F (36.3 °C)-98.3 °F (36.8 °C)] 97.8 °F (36.6 °C)  Pulse:  [53-70] 59  Resp:  [16-18] 18  SpO2:  [94 %-100 %] 100 %  BP: ()/(45-70) 91/54        There is no height or weight on file to calculate BMI.      Intake/Output Summary (Last 24 hours) at 11/4/2022 0756  Last data filed at 11/4/2022 0546  Gross per 24 hour   Intake --   Output 2135 ml   Net -2135 ml         Significant Labs:  Lab Results   Component Value Date    GROUPTRH O POS 11/03/2022    HEPBSAG Negative 05/31/2022     Recent Labs   Lab 11/03/22  1211   HGB 10.0*   HCT 30.8*       I have personallly reviewed all pertinent lab results from the last 24 hours.  Recent Lab Results         11/03/22  1329   11/03/22  1256   11/03/22  1211        Benzodiazepines Negative           Methadone metabolites Negative           Phencyclidine Negative           Amphetamine Screen, Ur Negative           Barbiturate Screen, Ur Negative           Baso #     0.01       Basophil %     0.1       Cocaine (Metab.) Negative           Creatinine, Urine 241.9           Differential Method     Automated       Eos #     0.0       Eosinophil %     0.0       Gran # (ANC)     5.7       Gran %     84.2       Group & Rh     O POS       Hematocrit     30.8       Hemoglobin     10.0       HIV Rapid Testing   Non-Reactive  Comment: Interpretation: Negative for HIV-1 and HIV-2 antibodies.          Immature Grans (Abs)     0.06  Comment: Mild elevation in immature granulocytes is non specific and   can be seen in a variety of conditions including stress response,   acute inflammation, trauma and pregnancy. Correlation with other   laboratory and clinical findings is essential.         Immature Granulocytes     0.9       INDIRECT RUFINO     NEG       Lymph #     0.4       Lymph %     6.1       MCH     29.3       MCHC     32.5       MCV     90       Mono #     0.6       Mono %     8.7       MPV     12.5       nRBC     0       Opiate Scrn, Ur Negative           Platelets     149       RBC     3.41       RDW     15.0       RPR   Non-reactive         Marijuana (THC) Metabolite Presumptive Positive           Toxicology Information SEE COMMENT  Comment: This screen includes the following classes of drugs at the listed   cut-off:    Benzodiazepines 200 ng/ml  Methadone 300 ng/ml  Cocaine metabolite 300 ng/ml  Opiates 300 ng/ml  Barbiturates 200 ng/ml  Amphetamines 1000 ng/ml  Marijuana metabs (THC) 50 ng/ml  Phencyclidine (PCP) 25 ng/ml    This is a screening test. If results do not correlate with clinical   presentation, then a confirmatory send out test is advised.     This report is intended for use in clinical monitoring and management   of   patients. It is not intended for use in employment related drug   testing.             WBC     6.75               Physical Exam:   Constitutional: She is oriented to person, place, and time. She appears well-developed. No distress.    HENT:   Head: Normocephalic and atraumatic.    Eyes: Conjunctivae are normal. Right eye exhibits no discharge. Left eye exhibits no discharge. No scleral icterus.     Cardiovascular:  Normal rate and regular rhythm.             Pulmonary/Chest: Effort normal and breath sounds normal. No respiratory distress.        Abdominal: Soft. Bowel sounds are normal. She exhibits abdominal incision (Pfannenstiel incision with acquacel dressing in place,  CDI. Appropriately TTP.). She exhibits no distension.   Fundus firm and below the level of the umbilicus             Musculoskeletal: Normal range of motion. No edema.       Neurological: She is alert and oriented to person, place, and time.    Skin: Skin is warm and dry. She is not diaphoretic.

## 2022-11-04 NOTE — PLAN OF CARE
Pt progressing well. Up ad chalino and voiding without difficulty. Pain controlled with po meds. Vitals stable. Bonding with baby.

## 2022-11-04 NOTE — ASSESSMENT & PLAN NOTE
Underwent RCS on 11/3/22.   - POD 1  - Tolerating PO  - Pain managed on current regimen  - Ambulating, encouraged patient to walk the halls and take a shower today  - Voiding spontaneously  - Doing well

## 2022-11-04 NOTE — PLAN OF CARE
Patient afebrile and had no falls this shift. Fundus firm with massage and below umbilicus. Bleeding light, no clots passed this shift. Cruz removed, due to void. Ambulates independently. Pain well controlled with oral pain medication. Vital signs stable at this time. Bonding well with infant; responds to infant cues and participates in infant care. Will continue to monitor.

## 2022-11-05 ENCOUNTER — PATIENT MESSAGE (OUTPATIENT)
Dept: OBSTETRICS AND GYNECOLOGY | Facility: HOSPITAL | Age: 32
End: 2022-11-05
Payer: MEDICAID

## 2022-11-05 VITALS
TEMPERATURE: 98 F | SYSTOLIC BLOOD PRESSURE: 121 MMHG | DIASTOLIC BLOOD PRESSURE: 73 MMHG | OXYGEN SATURATION: 99 % | RESPIRATION RATE: 16 BRPM | HEART RATE: 58 BPM

## 2022-11-05 PROBLEM — O34.219 PATIENT DESIRES VAGINAL BIRTH AFTER CESAREAN SECTION (VBAC): Status: RESOLVED | Noted: 2022-07-18 | Resolved: 2022-11-05

## 2022-11-05 PROBLEM — N89.8 CLEAR VAGINAL DISCHARGE: Status: RESOLVED | Noted: 2022-10-22 | Resolved: 2022-11-05

## 2022-11-05 PROCEDURE — 99238 PR HOSPITAL DISCHARGE DAY,<30 MIN: ICD-10-PCS | Mod: ,,, | Performed by: OBSTETRICS & GYNECOLOGY

## 2022-11-05 PROCEDURE — 25000003 PHARM REV CODE 250: Performed by: OBSTETRICS & GYNECOLOGY

## 2022-11-05 PROCEDURE — 99238 HOSP IP/OBS DSCHRG MGMT 30/<: CPT | Mod: ,,, | Performed by: OBSTETRICS & GYNECOLOGY

## 2022-11-05 RX ORDER — NAPROXEN 500 MG/1
500 TABLET ORAL EVERY 8 HOURS
Qty: 30 TABLET | Refills: 0 | Status: SHIPPED | OUTPATIENT
Start: 2022-11-05 | End: 2022-11-15

## 2022-11-05 RX ORDER — HYDROCODONE BITARTRATE AND ACETAMINOPHEN 5; 325 MG/1; MG/1
1 TABLET ORAL EVERY 4 HOURS PRN
Qty: 20 TABLET | Refills: 0 | Status: SHIPPED | OUTPATIENT
Start: 2022-11-05

## 2022-11-05 RX ADMIN — HYDROCODONE BITARTRATE AND ACETAMINOPHEN 1 TABLET: 10; 325 TABLET ORAL at 08:11

## 2022-11-05 RX ADMIN — PRENATAL VITAMINS-IRON FUMARATE 27 MG IRON-FOLIC ACID 0.8 MG TABLET 1 TABLET: at 08:11

## 2022-11-05 RX ADMIN — NAPROXEN 500 MG: 500 TABLET ORAL at 04:11

## 2022-11-05 RX ADMIN — DOCUSATE SODIUM 100 MG: 100 CAPSULE, LIQUID FILLED ORAL at 08:11

## 2022-11-05 RX ADMIN — HYDROCODONE BITARTRATE AND ACETAMINOPHEN 1 TABLET: 10; 325 TABLET ORAL at 04:11

## 2022-11-05 RX ADMIN — FERROUS SULFATE TAB 325 MG (65 MG ELEMENTAL FE) 1 EACH: 325 (65 FE) TAB at 08:11

## 2022-11-05 NOTE — PROGRESS NOTES
[facility] ([facility])  Obstetrics  Postpartum Progress Note    Patient Name: Kelsi Ngo  MRN: [id number]  Admission Date: 11/3/2022  Hospital Length of Stay: 2 days  Attending Physician: Jennifer Flores MD  Primary Care Provider: Primary Doctor No    Subjective:     Principal Problem:Status post repeat low transverse  section    Hospital Course:  Continuous monitoring  Documented hx of classical  section in op note from [facility] with previous del of  twins. Dr. Flores called and notified. Will prep patient and proceed to OR for repeat c/s. Pt agreeable to POC.   22: POD 1 s/p RCS. Tolerated surgery well, now on mother/baby unit. Doing well this morning.   22, stable for discharge pod #2      Interval History:   Pod #2    She is doing well this morning. She is tolerating a regular diet without nausea or vomiting. She is voiding spontaneously. She is ambulating. She has passed flatus, and has not a BM. Vaginal bleeding is mild. She denies fever or chills. Abdominal pain is mild and controlled with oral medications. She Is breastfeeding. She desires circumcision for her male baby: yes.    Objective:     Vital Signs (Most Recent):  Temp: 97.4 °F (36.3 °C) (22 0800)  Pulse: (!) 59 (22 0800)  Resp: 18 (22 0843)  BP: (!) 108/59 (22 0800)  SpO2: 100 % (22 0800)   Vital Signs (24h Range):  Temp:  [97.4 °F (36.3 °C)-98.1 °F (36.7 °C)] 97.4 °F (36.3 °C)  Pulse:  [50-61] 59  Resp:  [16-18] 18  SpO2:  [98 %-100 %] 100 %  BP: (105-114)/(52-60) 108/59        There is no height or weight on file to calculate BMI.      Intake/Output Summary (Last 24 hours) at 2022 1018  Last data filed at 2022 1200  Gross per 24 hour   Intake --   Output 600 ml   Net -600 ml         Significant Labs:  Lab Results   Component Value Date    GROUPTRH O POS 2022    HEPBSAG Negative 2022     Recent Labs   Lab 22  1211   HGB 10.0*   HCT  30.8*       I have personallly reviewed all pertinent lab results from the last 24 hours.  Recent Lab Results       None            Physical Exam:   Constitutional: She is oriented to person, place, and time. She appears well-developed.    HENT:   Head: Normocephalic.    Eyes: Pupils are equal, round, and reactive to light.     Cardiovascular:  Normal rate and regular rhythm.             Pulmonary/Chest: Effort normal and breath sounds normal.        Abdominal: Soft. Bowel sounds are normal. She exhibits no abdominal incision (aquasel dressing intact).     Genitourinary:    Genitourinary Comments: Ut--firm, non tender             Musculoskeletal: Normal range of motion and moves all extremeties.       Neurological: She is alert and oriented to person, place, and time. She has normal reflexes.    Skin: Skin is warm and dry.    Psychiatric: She has a normal mood and affect. Her behavior is normal. Judgment and thought content normal.     Assessment/Plan:     31 y.o. female  for:    * Status post repeat low transverse  section  Underwent RCS on 11/3/22.   - POD 1  - Tolerating PO  - Pain managed on current regimen  - Ambulating, encouraged patient to walk the halls and take a shower today  - Voiding spontaneously  - Doing well  2022  Pod #2  S/p rpt c/section  Afebrile  Tolerating diet, +void; pain controlled  Meeting all discharge goals, stable for discharge    Anemia during pregnancy in third trimester  - Iron PP        Disposition: stable for discharge.    Alicia Davis MD  Obstetrics  '[place] [facility] ([facility])

## 2022-11-05 NOTE — PLAN OF CARE
Patient afebrile and had no falls this shift. Fundus firm without massage and below umbilicus. Bleeding light, no clots passed this shift. Voids spontaneously. Ambulates independently. Pain well controlled with oral pain medication. Vital signs stable at this time. Bonding well with infant; responds to infant cues and participates in infant care. AVS reviewed with patient. Informed of follow up appointments. Patient voiced understanding with no questions at this time.

## 2022-11-05 NOTE — ASSESSMENT & PLAN NOTE
Underwent RCS on 11/3/22.   - POD 1  - Tolerating PO  - Pain managed on current regimen  - Ambulating, encouraged patient to walk the halls and take a shower today  - Voiding spontaneously  - Doing well  11/05/2022  Pod #2  S/p rpt c/section  Afebrile  Tolerating diet, +void; pain controlled  Meeting all discharge goals, stable for discharge

## 2022-11-05 NOTE — SUBJECTIVE & OBJECTIVE
Interval History:   Pod #2    She is doing well this morning. She is tolerating a regular diet without nausea or vomiting. She is voiding spontaneously. She is ambulating. She has passed flatus, and has not a BM. Vaginal bleeding is mild. She denies fever or chills. Abdominal pain is mild and controlled with oral medications. She Is breastfeeding. She desires circumcision for her male baby: yes.    Objective:     Vital Signs (Most Recent):  Temp: 97.4 °F (36.3 °C) (11/05/22 0800)  Pulse: (!) 59 (11/05/22 0800)  Resp: 18 (11/05/22 0843)  BP: (!) 108/59 (11/05/22 0800)  SpO2: 100 % (11/05/22 0800)   Vital Signs (24h Range):  Temp:  [97.4 °F (36.3 °C)-98.1 °F (36.7 °C)] 97.4 °F (36.3 °C)  Pulse:  [50-61] 59  Resp:  [16-18] 18  SpO2:  [98 %-100 %] 100 %  BP: (105-114)/(52-60) 108/59        There is no height or weight on file to calculate BMI.      Intake/Output Summary (Last 24 hours) at 11/5/2022 1018  Last data filed at 11/4/2022 1200  Gross per 24 hour   Intake --   Output 600 ml   Net -600 ml         Significant Labs:  Lab Results   Component Value Date    GROUPTRH O POS 11/03/2022    HEPBSAG Negative 05/31/2022     Recent Labs   Lab 11/03/22  1211   HGB 10.0*   HCT 30.8*       I have personallly reviewed all pertinent lab results from the last 24 hours.  Recent Lab Results       None            Physical Exam:   Constitutional: She is oriented to person, place, and time. She appears well-developed.    HENT:   Head: Normocephalic.    Eyes: Pupils are equal, round, and reactive to light.     Cardiovascular:  Normal rate and regular rhythm.             Pulmonary/Chest: Effort normal and breath sounds normal.        Abdominal: Soft. Bowel sounds are normal. She exhibits no abdominal incision (aquasel dressing intact).     Genitourinary:    Genitourinary Comments: Ut--firm, non tender             Musculoskeletal: Normal range of motion and moves all extremeties.       Neurological: She is alert and oriented to person,  place, and time. She has normal reflexes.    Skin: Skin is warm and dry.    Psychiatric: She has a normal mood and affect. Her behavior is normal. Judgment and thought content normal.

## 2022-11-05 NOTE — DISCHARGE SUMMARY
[facility])  Obstetrics  Discharge Summary      Patient Name: Kelsi Ngo  MRN: [id number]  Admission Date: 11/3/2022  Hospital Length of Stay: 2 days  Discharge Date and Time:  2022 10:26 AM  Attending Physician: Jennifer Flores MD   Discharging Provider: Alicia Mark MD   Primary Care Provider: Primary Doctor No    HPI: 31 y.o.  CECILIA 11/10/2022 EGA 39w0d arrived via EMS with c/o contractions every 15 minutes          Procedure(s) (LRB):   SECTION (N/A)     Hospital Course:   Continuous monitoring  Documented hx of classical  section in op note from [facility] with previous del of  twins. Dr. Flores called and notified. Will prep patient and proceed to OR for repeat c/s. Pt agreeable to POC.   22: POD 1 s/p RCS. Tolerated surgery well, now on mother/baby unit. Doing well this morning.   22, stable for discharge pod #2       Consults (From admission, onward)        Status Ordering Provider     Inpatient consult to Respiratory Care  Once        Provider:  (Not yet assigned)    Ordered ALICIA MARK          Final Active Diagnoses:    Diagnosis Date Noted POA    PRINCIPAL PROBLEM:  S/P  section [Z98.891] 2022 Not Applicable    Anemia during pregnancy in third trimester [O99.013] 2022 Yes      Problems Resolved During this Admission:    Diagnosis Date Noted Date Resolved POA    Threatened labor at term [O47.9] 2022 Yes    Marijuana use during pregnancy [O99.320, F12.90] 2022 Yes    Limited prenatal care in third trimester [O09.33] 10/24/2022 2022 Not Applicable        Significant Diagnostic Studies: Labs: All labs within the past 24 hours have been reviewed      Feeding Method: breast    Immunizations     None          Delivery:    Episiotomy:     Lacerations:     Repair suture:     Repair # of packets:     Blood loss (ml):       Birth information:  YOB: 2021    Time of birth: 1:38 PM   Sex: male   Delivery type: , Low Transverse   Gestational Age: 39w0d    Delivery Clinician:      Other providers:       Additional  information:  Forceps:    Vacuum:    Breech:    Observed anomalies      Living?:           APGARS  One minute Five minutes Ten minutes   Skin color:         Heart rate:         Grimace:         Muscle tone:         Breathing:         Totals: 8  9        Placenta: Delivered:       appearance    Pending Diagnostic Studies:     None          Discharged Condition: good    Disposition: Home or Self Care    Follow Up:   Follow-up Information     [facility]10/2022.    Why: Dressing removal and BP check (afternoon appointment)           Jennifer Flores MD Follow up in 1 month(s).    Specialties: Obstetrics and Gynecology, Obstetrics  Why: Postpartum  Contact information:  [address] [place] [address] DR Narinder ALVAREZ [id number] [phone]                       Patient Instructions:      BREAST PUMP FOR HOME USE     Order Specific Question Answer Comments   Type of pump: Electric    Weight: 56.8kg    Length of need (1-99 months): 99      Leave dressing on - Keep it clean, dry, and intact until clinic visit     Pelvic Rest   Order Comments: X 6 wks--no tampons, intercourse douching     Lifting restrictions   Order Comments: No lifting more than infant and car seat     Other restrictions (specify):   Order Comments: Showers only for 6 wks     Medications:  Current Discharge Medication List      START taking these medications    Details   HYDROcodone-acetaminophen (NORCO) 5-325 mg per tablet Take 1 tablet by mouth every 4 (four) hours as needed for Pain.  Qty: 20 tablet, Refills: 0    Comments: Quantity prescribed more than 7 day supply? No      naproxen (NAPROSYN) 500 MG tablet Take 1 tablet (500 mg total) by mouth every 8 (eight) hours. for 10 days  Qty: 30 tablet, Refills: 0         CONTINUE these medications which have NOT CHANGED    Details    docusate sodium (COLACE) 100 MG capsule Take 1 capsule (100 mg total) by mouth 2 (two) times daily as needed for Constipation.  Qty: 30 capsule, Refills: 1    Associated Diagnoses: Constipation, unspecified constipation type      ferrous sulfate 325 (65 FE) MG EC tablet Take 1 tablet (325 mg total) by mouth 2 (two) times daily.  Qty: 60 tablet, Refills: 3      prenatal vit 14-iron fum-folic (COMPLETENATE) 29 mg iron- 1 mg Chew Take 1 tablet by mouth once daily.  Qty: 90 tablet, Refills: 3         STOP taking these medications       ondansetron (ZOFRAN) 4 MG tablet Comments:   Reason for Stopping:               Alicia Davis MD  Obstetrics  [facility] ([facility])

## 2022-11-05 NOTE — DISCHARGE INSTRUCTIONS
"Mother Self Care:    Activity: Avoid strenuous exercise and get adequate rest.  No driving until the physician consent given.  Emotional Changes: Most women find birth to be a time of great emotional upheaval.  Sense of loss, mood swings, fatigue, anxiety, and feeling "let down" are common.  If feelings worsen or last more than a week, call your physician.  Breast Care/Breastfeeding: Wear a bra for comfort.  Keep nipples dry and apply your own breast milk or lanolin cream as needed for soreness.  Engorgement can be relieved with warm, moist heat before feedings.  You may also take Ibuprofen.  Sindhu-Care/Vaginal Bleeding: Remember to use your sindhu-bottle after urinating.  Your flow will change from red, to pink, to yellow/white color over a period of 2 weeks.  Menstruation will return in 3-8 weeks, or longer if breastfeeding.   Section: Keep incision clean and dry. You may shower, but avoid baths.  Sexual Activity/Pelvic Rest: No sexual activity, tampons, or douching until your physician gives you consent.  Diet: Continue to eat from the five basic food groups, including plenty of protein, fruits, vegetables, and whole grains.  Limit empty calories and high fat foods.  Drink enough fluids to satisfy thirst and add an extra 500 calories for breastfeeding.  Constipation/Hemorrhoids: Drink plenty of water.  You may take a stool softener or natural laxative (Metamucil). You may use tucks or hemorrhoid ointment and soak in a warm tub.    CALL YOUR OB DOCTOR IF ANY OF THE FOLLOWING OCCURS:  *Heavy bleeding - saturating a pad an hour or passing any large (2-3 inches in size) blood clots.  *Any pain, redness, or tenderness in lower leg.  *You cannot care for yourself or your baby.  *Any signs of infection-      - Temperature greater than 100.5 degrees F      - Foul smelling vaginal discharge and/or incisional drainage      - Increased incisional pain      - Hot, hard, red or sore area on breast      - Flu-like " symptoms      - Any urgency, frequency or burning with urination    Return To the Hospital for further Evaluation:  Headache not relieved by tylenol or ibuprofen  Blurry vision, double vision, seeing spots, or flashing lights  Feeling faint or passing out  Right epigastric pain  Difficulty breathing  Swelling in hands, face, or feet  Any of these symptoms accompanied by nausea/vomiting  Gaining more than 5 pounds in one week  Seizures  These symptoms could be an indication of elevated blood pressure.       If you have any questions that need to be answered immediately please call the Labor & Delivery Unit at [phone] and ask to speak to a nurse.

## 2022-11-05 NOTE — LACTATION NOTE
Pt reports that breastfeeding is going well. Pt denies any pain with feeding and reports hearing swallows and infant is satisfied after feeding.    Mother anticipates discharge home today. Reviewed signs of good attachment. Reviewed breast massage and compression during feedings and indications for use. Reviewed signs of effective milk transfer and instructed to call pediatrician and lactation if signs not present. Discussed expected feeding and output pattern for days of life 2, 3, 4, & 5+; mother instructed to call pediatrician and lactation if infant is not meeting feeding and output goals.     Reviewed signs of engorgement and expectant management. Reviewed signs of mastitis and instructed mother to call OB provider and lactation if any signs present. Discussed proper use of First Alert Form. Reviewed proper milk handling, collection and storage guidelines. Reviewed nursing diet and nutrition. Discussed resources for medication safety while breastfeeding. Reviewed available outpatient lactation resources.     Mother verbalizes understanding of all education and counseling; she denies any further lactation needs or concerns at this time. Encouraged mother to contact lactation with any questions, concerns, or problems, contact number provided.

## 2022-11-10 ENCOUNTER — CLINICAL SUPPORT (OUTPATIENT)
Dept: OBSTETRICS AND GYNECOLOGY | Facility: CLINIC | Age: 32
End: 2022-11-10
Payer: MEDICAID

## 2022-11-10 VITALS — DIASTOLIC BLOOD PRESSURE: 70 MMHG | SYSTOLIC BLOOD PRESSURE: 114 MMHG

## 2022-11-10 DIAGNOSIS — Z01.30 BLOOD PRESSURE CHECK: ICD-10-CM

## 2022-11-10 DIAGNOSIS — L76.82 INCISIONAL PAIN: Primary | ICD-10-CM

## 2022-11-10 PROCEDURE — 96372 THER/PROPH/DIAG INJ SC/IM: CPT | Mod: PBBFAC

## 2022-11-10 PROCEDURE — 99212 OFFICE O/P EST SF 10 MIN: CPT | Mod: PBBFAC

## 2022-11-10 PROCEDURE — 99999 PR PBB SHADOW E&M-EST. PATIENT-LVL II: ICD-10-PCS | Mod: PBBFAC,,,

## 2022-11-10 PROCEDURE — 99999 PR PBB SHADOW E&M-EST. PATIENT-LVL II: CPT | Mod: PBBFAC,,,

## 2022-11-10 RX ORDER — KETOROLAC TROMETHAMINE 30 MG/ML
60 INJECTION, SOLUTION INTRAMUSCULAR; INTRAVENOUS
Status: COMPLETED | OUTPATIENT
Start: 2022-11-10 | End: 2022-11-10

## 2022-11-10 RX ADMIN — KETOROLAC TROMETHAMINE 60 MG: 60 INJECTION, SOLUTION INTRAMUSCULAR at 03:11

## 2022-11-10 NOTE — PROGRESS NOTES
Patient visit for dressing removal and BP check. Blood pressure within normal limits. Dressing removed from lower abdomen. No redness, swelling, odor, or discharge notice to incision. Left side of incision not completely closed. Steri strips applied. Advised to keep steri strips in place for a few days for incision to completely heal. Advised to watch for any signs of infection. Patient complaint of unrelieved pain to incision. Ketorolac given as ordered.

## 2022-11-10 NOTE — PROGRESS NOTES
Pt here for nurse visit, dressing removal post CS 1 week ago. C/o incisional pain, Norcos not working, Naproxen makes her stomach hurt. Pt had Toradol injection IV inpatient without issue. Will order patient IM Toradol for relief until she can contact her OB.
